# Patient Record
Sex: MALE | Race: WHITE | NOT HISPANIC OR LATINO | Employment: OTHER | URBAN - METROPOLITAN AREA
[De-identification: names, ages, dates, MRNs, and addresses within clinical notes are randomized per-mention and may not be internally consistent; named-entity substitution may affect disease eponyms.]

---

## 2024-10-09 ENCOUNTER — HOSPITAL ENCOUNTER (OUTPATIENT)
Dept: OTHER | Facility: HOSPITAL | Age: 64
Discharge: HOME | End: 2024-10-09

## 2024-10-18 ENCOUNTER — OFFICE VISIT (OUTPATIENT)
Dept: VASCULAR SURGERY | Facility: HOSPITAL | Age: 64
End: 2024-10-18
Payer: MEDICARE

## 2024-10-18 ENCOUNTER — PATIENT ROUNDING (BHMG ONLY) (OUTPATIENT)
Dept: VASCULAR SURGERY | Facility: HOSPITAL | Age: 64
End: 2024-10-18

## 2024-10-18 VITALS
WEIGHT: 225 LBS | OXYGEN SATURATION: 98 % | SYSTOLIC BLOOD PRESSURE: 176 MMHG | BODY MASS INDEX: 27.98 KG/M2 | HEART RATE: 89 BPM | HEIGHT: 75 IN | RESPIRATION RATE: 16 BRPM | TEMPERATURE: 99.1 F | DIASTOLIC BLOOD PRESSURE: 88 MMHG

## 2024-10-18 DIAGNOSIS — I65.23 OCCLUSION AND STENOSIS OF BILATERAL CAROTID ARTERIES: ICD-10-CM

## 2024-10-18 DIAGNOSIS — I25.10 CORONARY ARTERY DISEASE INVOLVING NATIVE CORONARY ARTERY OF NATIVE HEART WITHOUT ANGINA PECTORIS: Primary | ICD-10-CM

## 2024-10-18 PROCEDURE — G0463 HOSPITAL OUTPT CLINIC VISIT: HCPCS | Performed by: SURGERY

## 2024-10-18 RX ORDER — FLUTICASONE PROPIONATE 50 UG/1
2 SPRAY, METERED NASAL DAILY
COMMUNITY

## 2024-10-18 RX ORDER — CLOPIDOGREL BISULFATE 75 MG/1
75 TABLET ORAL DAILY
COMMUNITY

## 2024-10-18 RX ORDER — FINASTERIDE 5 MG/1
1 TABLET, FILM COATED ORAL DAILY
COMMUNITY

## 2024-10-18 RX ORDER — MELOXICAM 7.5 MG/1
1 TABLET ORAL DAILY
COMMUNITY

## 2024-10-18 RX ORDER — ASPIRIN 325 MG
325 TABLET, DELAYED RELEASE (ENTERIC COATED) ORAL DAILY
COMMUNITY

## 2024-10-18 RX ORDER — ZOLPIDEM TARTRATE 12.5 MG/1
1 TABLET, FILM COATED, EXTENDED RELEASE ORAL NIGHTLY
COMMUNITY

## 2024-10-18 RX ORDER — ESCITALOPRAM OXALATE 10 MG/1
10 TABLET ORAL DAILY
COMMUNITY

## 2024-10-18 RX ORDER — TESTOSTERONE CYPIONATE 200 MG/ML
200 INJECTION, SOLUTION INTRAMUSCULAR
COMMUNITY
Start: 2024-10-16

## 2024-10-18 RX ORDER — OXYCODONE AND ACETAMINOPHEN 10; 325 MG/1; MG/1
1 TABLET ORAL EVERY 6 HOURS PRN
COMMUNITY

## 2024-10-18 RX ORDER — LOVASTATIN 40 MG
1 TABLET ORAL NIGHTLY
COMMUNITY

## 2024-10-18 RX ORDER — LOSARTAN POTASSIUM 25 MG/1
1 TABLET ORAL DAILY
COMMUNITY

## 2024-10-18 RX ORDER — ESOMEPRAZOLE MAGNESIUM 40 MG/1
40 CAPSULE, DELAYED RELEASE ORAL
COMMUNITY
Start: 2024-08-20

## 2024-10-18 NOTE — PROGRESS NOTES
[facility]   HISTORY AND PHYSICAL    Patient Name: Jordan Delgado  : 1960  MRN: [id number]  Primary Care Physician:  Jordan De La Cruz MD  Date of admission: (Not on file)    Subjective   Subjective     Chief Complaint: Severe symptomatic right carotid stenosis    HPI:    Jordan Delgado is a 64 y.o. male who 1 week ago noticed some numbness and tingling of the left hand and he noticed that his speech was not right.  He went to the local emergency room and was evaluated for carotid disease and found to have severe right carotid stenosis.  An MRI was not at the time.  His left hand feels back to normal and did so within about 24 hours although his speech is still a little off.  He comes to see me for further evaluation from the vascular standpoint.  No previous similar events that he can recall.  Former smoker who smoked for about 20 years but quit about 20 years ago.  He is reasonably active but not as much as he used to.  If he goes up 1 flight of stairs he feels a little short of breath.  No previous cardiac history.    Review of Systems    Non contributory except for the History of Present Illness    Personal History     Past Medical History:   Diagnosis Date    Anxiety     Carotid stenosis        History reviewed. No pertinent surgical history.    Family History: family history is not on file. Otherwise pertinent FHx was reviewed and not pertinent to current issue.    Social History:  reports that he has never smoked. He has never used smokeless tobacco. He reports current alcohol use of about 4.0 standard drinks of alcohol per week. He reports current drug use. Drug: Marijuana.    Home Medications:  Current Outpatient Medications on File Prior to Visit   Medication Sig    aspirin 325 MG EC tablet 1 tablet Daily.    clopidogrel (PLAVIX) 75 MG tablet Take 1 tablet by mouth Daily.    escitalopram (LEXAPRO) 10 MG tablet Take 1 tablet by mouth Daily.    esomeprazole (nexIUM) 40 MG capsule Take 1  capsule by mouth Every Morning Before Breakfast.    finasteride (PROSCAR) 5 MG tablet Take 1 tablet by mouth Daily.    fluticasone (FLONASE) 50 MCG/ACT nasal spray 2 sprays by Each Nare route Daily.    losartan (COZAAR) 25 MG tablet Take 1 tablet by mouth Daily.    lovastatin (MEVACOR) 40 MG tablet Take 1 tablet by mouth Every Night.    meloxicam (MOBIC) 7.5 MG tablet Take 1 tablet by mouth Daily.    oxyCODONE-acetaminophen (PERCOCET)  MG per tablet Take 1 tablet by mouth Every 6 (Six) Hours As Needed.    Testosterone Cypionate (DEPOTESTOTERONE CYPIONATE) 200 MG/ML injection Inject 1 mL into the appropriate muscle as directed by prescriber Every 28 (Twenty-Eight) Days.    zolpidem CR (AMBIEN CR) 12.5 MG CR tablet Take 1 tablet by mouth Every Night.     No current facility-administered medications on file prior to visit.          Allergies:  No Known Allergies    Objective   Objective     Vitals:   Temp:  [99.1 °F (37.3 °C)] 99.1 °F (37.3 °C)  Heart Rate:  [89] 89  Resp:  [16] 16  BP: (176)/(88) 176/88    Physical Exam    General: Awake, alert, NAD   Eyes:  RAFA   Neck: Supple   Lungs: Clear   Heart: RRR   Abdomen: benign   Musculoskeletal:  normal motor tone, symmetric   Skin: warm, normal turgor   Neuro: strength 5/5 all extremities   Pulses: +2 bilateral radial pulses.    Diagnostic studies:   A CTA of the neck dated 10/9/2024 has been reviewed.  Severe right carotid stenosis.    Assessment & Plan   Assessment / Plan     Active Hospital Problems:  There are no active hospital problems to display for this patient.      Diagnoses and all orders for this visit:    1. Coronary artery disease involving native coronary artery of native heart without angina pectoris (Primary)  -     Ambulatory Referral to Cardiology    2. Occlusion and stenosis of bilateral carotid arteries        Assessment/plan:   Mr. Delgado has severe symptomatic right carotid stenosis.  He appears to be a good surgical candidate.  At this time  I am recommending that we proceed to a right carotid endarterectomy.  I have discussed with him in detail the mechanics of the procedure, the indications, benefits, risks, alternatives as well as potential complications to include but not limited to infection, bleeding, transfusion, reoperation, cranial nerve injury, stroke, death.  He appears to understand and desires to proceed.  Cardiac clearance will be obtained in preparation for surgery.      Electronically signed by Aniceto Saucedo MD, 10/18/24, 9:50 AM EDT.

## 2024-10-22 ENCOUNTER — OFFICE VISIT (OUTPATIENT)
Dept: CARDIOLOGY | Facility: CLINIC | Age: 64
End: 2024-10-22
Payer: MEDICARE

## 2024-10-22 VITALS
SYSTOLIC BLOOD PRESSURE: 139 MMHG | DIASTOLIC BLOOD PRESSURE: 96 MMHG | WEIGHT: 225 LBS | BODY MASS INDEX: 27.98 KG/M2 | HEIGHT: 75 IN | HEART RATE: 94 BPM

## 2024-10-22 DIAGNOSIS — R06.09 DYSPNEA ON EXERTION: ICD-10-CM

## 2024-10-22 DIAGNOSIS — Z01.810 PREOP CARDIOVASCULAR EXAM: ICD-10-CM

## 2024-10-22 DIAGNOSIS — I65.21 CAROTID ARTERY STENOSIS, SYMPTOMATIC, RIGHT: Primary | ICD-10-CM

## 2024-10-22 PROCEDURE — 1160F RVW MEDS BY RX/DR IN RCRD: CPT | Performed by: INTERNAL MEDICINE

## 2024-10-22 PROCEDURE — 99204 OFFICE O/P NEW MOD 45 MIN: CPT | Performed by: INTERNAL MEDICINE

## 2024-10-22 PROCEDURE — 1159F MED LIST DOCD IN RCRD: CPT | Performed by: INTERNAL MEDICINE

## 2024-10-22 NOTE — PROGRESS NOTES
Chief Complaint  new patient (Vascular surgery )    Subjective            Jordan Delgado presents to [facility] CARDIOLOGY  History of Present Illness    64-year-old white male.  He has no previous cardiac problems.  He recently had left-sided weakness and speech difficulty.  He went to the hospital and was diagnosed clinically with a stroke.  Subsequent imaging showed severe right carotid stenosis.  He has seen vascular surgery in consultation and will need carotid endarterectomy.  The patient's symptoms have improved.  He has no prior cardiac history.  He does report shortness of breath with exertion which has been going on for quite a while.  He denies chest pain.  His EKG during his recent hospitalization showed left bundle branch block.    PMH  Past Medical History:   Diagnosis Date    Anxiety     Carotid stenosis          SURGICALHX  No past surgical history on file.     SOC  Social History     Socioeconomic History    Marital status:    Tobacco Use    Smoking status: Never    Smokeless tobacco: Never   Vaping Use    Vaping status: Never Used   Substance and Sexual Activity    Alcohol use: Yes     Alcohol/week: 4.0 standard drinks of alcohol     Types: 4 Cans of beer per week    Drug use: Yes     Types: Marijuana    Sexual activity: Defer         FAMHX  Family History   Problem Relation Age of Onset    No Known Problems Mother     No Known Problems Father           ALLERGY  No Known Allergies     MEDSCURRENT    Current Outpatient Medications:     aspirin 325 MG EC tablet, 1 tablet Daily., Disp: , Rfl:     clopidogrel (PLAVIX) 75 MG tablet, Take 1 tablet by mouth Daily., Disp: , Rfl:     escitalopram (LEXAPRO) 10 MG tablet, Take 1 tablet by mouth Daily., Disp: , Rfl:     esomeprazole (nexIUM) 40 MG capsule, Take 1 capsule by mouth Every Morning Before Breakfast., Disp: , Rfl:     finasteride (PROSCAR) 5 MG tablet, Take 1 tablet by mouth Daily., Disp: , Rfl:     fluticasone (FLONASE)  "50 MCG/ACT nasal spray, 2 sprays by Each Nare route Daily., Disp: , Rfl:     losartan (COZAAR) 25 MG tablet, Take 1 tablet by mouth Daily., Disp: , Rfl:     lovastatin (MEVACOR) 40 MG tablet, Take 1 tablet by mouth Every Night., Disp: , Rfl:     meloxicam (MOBIC) 7.5 MG tablet, Take 1 tablet by mouth Daily., Disp: , Rfl:     oxyCODONE-acetaminophen (PERCOCET)  MG per tablet, Take 1 tablet by mouth Every 6 (Six) Hours As Needed., Disp: , Rfl:     Testosterone Cypionate (DEPOTESTOTERONE CYPIONATE) 200 MG/ML injection, Inject 1 mL into the appropriate muscle as directed by prescriber Every 28 (Twenty-Eight) Days., Disp: , Rfl:     zolpidem CR (AMBIEN CR) 12.5 MG CR tablet, Take 1 tablet by mouth Every Night., Disp: , Rfl:       Review of Systems   Constitutional: Negative.   HENT: Negative.     Eyes: Negative.    Cardiovascular:  Positive for dyspnea on exertion. Negative for chest pain.   Respiratory:  Positive for shortness of breath.    Endocrine: Negative.    Hematologic/Lymphatic: Negative.    Skin: Negative.    Musculoskeletal: Negative.    Gastrointestinal: Negative.    Genitourinary: Negative.    Neurological:  Positive for focal weakness.   Psychiatric/Behavioral: Negative.          Objective     /96   Pulse 94   Ht 190.5 cm (75\")   Wt 102 kg (225 lb)   BMI 28.12 kg/m²       General Appearance:   well developed  well nourished  HENT:   oropharynx moist  lips not cyanotic  Neck:  thyroid not enlarged  supple  Respiratory:  no respiratory distress  normal breath sounds  no rales  Cardiovascular:  no jugular venous distention  regular rhythm  apical impulse normal  S1 normal, S2 normal  no S3, no S4   no murmur  no rub, no thrill  carotid pulses normal  pedal pulses normal  lower extremity edema: none    Musculoskeletal:  no clubbing of fingers.   normocephalic, head atraumatic  Skin:   warm, dry  Psychiatric:  judgement and insight appropriate  normal mood and affect      Result Review : "             Data reviewed : Hospital records reviewed, EKG reviewed by me which showed sinus rhythm, left bundle branch block, vascular surgery office notes reviewed      Procedures           Assessment and Plan        ASSESSMENT:  Encounter Diagnoses   Name Primary?    Carotid artery stenosis, symptomatic, right Yes    Preop cardiovascular exam     Dyspnea on exertion          PLAN:    1.  Symptomatic carotid artery stenosis, on the right-he will need carotid endarterectomy.  He has dyspnea on exertion at baseline.  His EKG is abnormal with left bundle branch block.  Pharmacologic stress imaging will be scheduled to rule out high risk ischemia before vascular surgery.  Continue dual antiplatelet therapy for prevention, continue statin therapy  2.  We will discuss the diagnostic results when available, and coordinate with Dr. Saucedo          Patient was given instructions and counseling regarding his condition or for health maintenance advice. Please see specific information pulled into the AVS if appropriate.             MIRANDA Abarca MD  10/22/2024    11:15 EDT

## 2024-10-28 DIAGNOSIS — Z01.810 PREOP CARDIOVASCULAR EXAM: ICD-10-CM

## 2024-10-28 DIAGNOSIS — I65.21 CAROTID ARTERY STENOSIS, SYMPTOMATIC, RIGHT: ICD-10-CM

## 2024-10-29 ENCOUNTER — TELEPHONE (OUTPATIENT)
Dept: CARDIOLOGY | Facility: CLINIC | Age: 64
End: 2024-10-29
Payer: MEDICARE

## 2024-10-29 NOTE — TELEPHONE ENCOUNTER
----- Message from MIRANDA Abarca sent at 10/29/2024  2:42 PM EDT -----  SPECT showed no evidence of significantly blocked arteries on this study.  Heart function is in the lower range of normal    Plan is continue current medication, please send cardiac clearance notification to Dr. Saucedo for vascular surgery, low to moderate stable cardiovascular risk, no additional testing is needed

## 2024-10-30 ENCOUNTER — PREP FOR SURGERY (OUTPATIENT)
Dept: OTHER | Facility: HOSPITAL | Age: 64
End: 2024-10-30
Payer: MEDICARE

## 2024-10-30 ENCOUNTER — TELEPHONE (OUTPATIENT)
Dept: CARDIOLOGY | Facility: CLINIC | Age: 64
End: 2024-10-30
Payer: MEDICARE

## 2024-10-30 DIAGNOSIS — I65.21 CAROTID ARTERY STENOSIS, SYMPTOMATIC, RIGHT: Primary | ICD-10-CM

## 2024-10-30 RX ORDER — CLOPIDOGREL BISULFATE 75 MG/1
75 TABLET ORAL DAILY
Qty: 30 TABLET | Refills: 11 | Status: SHIPPED | OUTPATIENT
Start: 2024-10-30

## 2024-10-31 PROBLEM — I65.21 CAROTID ARTERY STENOSIS, SYMPTOMATIC, RIGHT: Status: ACTIVE | Noted: 2024-10-30

## 2024-11-07 RX ORDER — ASPIRIN 325 MG
325 TABLET, DELAYED RELEASE (ENTERIC COATED) ORAL DAILY
Qty: 90 TABLET | Refills: 3 | Status: SHIPPED | OUTPATIENT
Start: 2024-11-07

## 2024-12-10 ENCOUNTER — PRE-ADMISSION TESTING (OUTPATIENT)
Dept: PREADMISSION TESTING | Facility: HOSPITAL | Age: 64
End: 2024-12-10
Payer: MEDICARE

## 2024-12-10 VITALS
DIASTOLIC BLOOD PRESSURE: 85 MMHG | HEART RATE: 102 BPM | TEMPERATURE: 97.2 F | BODY MASS INDEX: 27.37 KG/M2 | RESPIRATION RATE: 16 BRPM | HEIGHT: 75 IN | SYSTOLIC BLOOD PRESSURE: 151 MMHG | WEIGHT: 220.13 LBS | OXYGEN SATURATION: 95 %

## 2024-12-10 DIAGNOSIS — Z01.818 PRE-OP TESTING: Primary | ICD-10-CM

## 2024-12-10 DIAGNOSIS — I65.21 CAROTID ARTERY STENOSIS, SYMPTOMATIC, RIGHT: ICD-10-CM

## 2024-12-10 LAB
ABO GROUP BLD: NORMAL
ALBUMIN SERPL-MCNC: 4.3 G/DL (ref 3.5–5.2)
ALBUMIN/GLOB SERPL: 1.6 G/DL
ALP SERPL-CCNC: 73 U/L (ref 39–117)
ALT SERPL W P-5'-P-CCNC: 37 U/L (ref 1–41)
ANION GAP SERPL CALCULATED.3IONS-SCNC: 12.7 MMOL/L (ref 5–15)
AST SERPL-CCNC: 30 U/L (ref 1–40)
BASOPHILS # BLD AUTO: 0.08 10*3/MM3 (ref 0–0.2)
BASOPHILS NFR BLD AUTO: 1 % (ref 0–1.5)
BILIRUB SERPL-MCNC: 0.8 MG/DL (ref 0–1.2)
BUN SERPL-MCNC: 16 MG/DL (ref 8–23)
BUN/CREAT SERPL: 11.5 (ref 7–25)
CALCIUM SPEC-SCNC: 9 MG/DL (ref 8.6–10.5)
CHLORIDE SERPL-SCNC: 105 MMOL/L (ref 98–107)
CO2 SERPL-SCNC: 22.3 MMOL/L (ref 22–29)
CREAT SERPL-MCNC: 1.39 MG/DL (ref 0.76–1.27)
DEPRECATED RDW RBC AUTO: 40.5 FL (ref 37–54)
EGFRCR SERPLBLD CKD-EPI 2021: 56.3 ML/MIN/1.73
EOSINOPHIL # BLD AUTO: 0.07 10*3/MM3 (ref 0–0.4)
EOSINOPHIL NFR BLD AUTO: 0.9 % (ref 0.3–6.2)
ERYTHROCYTE [DISTWIDTH] IN BLOOD BY AUTOMATED COUNT: 11.5 % (ref 12.3–15.4)
GLOBULIN UR ELPH-MCNC: 2.7 GM/DL
GLUCOSE SERPL-MCNC: 110 MG/DL (ref 65–99)
HCT VFR BLD AUTO: 45 % (ref 37.5–51)
HGB BLD-MCNC: 15.4 G/DL (ref 13–17.7)
IMM GRANULOCYTES # BLD AUTO: 0.06 10*3/MM3 (ref 0–0.05)
IMM GRANULOCYTES NFR BLD AUTO: 0.8 % (ref 0–0.5)
LYMPHOCYTES # BLD AUTO: 1.45 10*3/MM3 (ref 0.7–3.1)
LYMPHOCYTES NFR BLD AUTO: 18.7 % (ref 19.6–45.3)
MCH RBC QN AUTO: 33 PG (ref 26.6–33)
MCHC RBC AUTO-ENTMCNC: 34.2 G/DL (ref 31.5–35.7)
MCV RBC AUTO: 96.4 FL (ref 79–97)
MONOCYTES # BLD AUTO: 0.52 10*3/MM3 (ref 0.1–0.9)
MONOCYTES NFR BLD AUTO: 6.7 % (ref 5–12)
NEUTROPHILS NFR BLD AUTO: 5.57 10*3/MM3 (ref 1.7–7)
NEUTROPHILS NFR BLD AUTO: 71.9 % (ref 42.7–76)
NRBC BLD AUTO-RTO: 0 /100 WBC (ref 0–0.2)
PLATELET # BLD AUTO: 229 10*3/MM3 (ref 140–450)
PMV BLD AUTO: 10.4 FL (ref 6–12)
POTASSIUM SERPL-SCNC: 4.2 MMOL/L (ref 3.5–5.2)
PROT SERPL-MCNC: 7 G/DL (ref 6–8.5)
RBC # BLD AUTO: 4.67 10*6/MM3 (ref 4.14–5.8)
RH BLD: POSITIVE
SODIUM SERPL-SCNC: 140 MMOL/L (ref 136–145)
WBC NRBC COR # BLD AUTO: 7.75 10*3/MM3 (ref 3.4–10.8)

## 2024-12-10 PROCEDURE — 80053 COMPREHEN METABOLIC PANEL: CPT

## 2024-12-10 PROCEDURE — 85025 COMPLETE CBC W/AUTO DIFF WBC: CPT | Performed by: SURGERY

## 2024-12-10 PROCEDURE — 36415 COLL VENOUS BLD VENIPUNCTURE: CPT

## 2024-12-10 PROCEDURE — 86900 BLOOD TYPING SEROLOGIC ABO: CPT

## 2024-12-10 PROCEDURE — 86901 BLOOD TYPING SEROLOGIC RH(D): CPT

## 2024-12-10 RX ORDER — TAMSULOSIN HYDROCHLORIDE 0.4 MG/1
0.4 CAPSULE ORAL
COMMUNITY

## 2024-12-10 NOTE — DISCHARGE INSTRUCTIONS
IMPORTANT INSTRUCTIONS - PRE-ADMISSION TESTING  DO NOT EAT OR CHEW anything after midnight the night before your procedure.    NPO AFTER MN EXCEPT SIPS OF WATER TO TAKE MEDICATIONS LISTED BELOW  Take the following medications the morning of your procedure with JUST A SIP OF WATER:  ___  PER DR SHAFFER TO CONTINUE ASPIRIN AND PLAVIX UP TO AND INCLUDING DAY OF SURGERY, ESCITALOPRAM, ESOMEPRAZOLE, FINASTERIDE, OXYCODONE IF NEEDED, TAMSULOSIN____________________________________________________________________________________________________________________________________________________________________________________    DO NOT BRING your medications to the hospital with you, UNLESS something has changed since your PRE-Admission Testing appointment.  STARTING NOW Hold all vitamins, supplements, and NSAIDS INCLUDING MELOXICAM(Non- steroidal anti-inflammatory meds) for one week prior to surgery (you MAY take Tylenol or Acetaminophen).  If you are diabetic, check your blood sugar the morning of your procedure. If it is less than 70 or if you are feeling symptomatic, call the following number for further instructions: [phone]  Use your inhalers/nebulizers as usual, the morning of your procedure. BRING YOUR INHALERS with you.   Bring your CPAP or BIPAP to hospital, ONLY IF YOU WILL BE SPENDING THE NIGHT.   Make sure you have a ride home and have someone who will stay with you the day of your procedure after you go home.  If you have any questions, please call your Pre-Admission Testing Nurse, ___JOEL_____________ at [phone]____________.   Per anesthesia request, do not smoke for 24 hours before your procedure or as instructed by your surgeon.    WILL CALL ON  12/16/24       NORMALLY BETWEEN 1 AND 4 PM TO GIVE OFFICIAL ARRIVAL TIME FOR DAY OF PROCEDURE  REFER TO BATHING SHEET FOR BATHING INSTRUCTIONS . NO JEWELRY OF ANY TYPE  COME TO ENTRANCE A, ELEVATOR A, 3RD FLOOR DAY OF PROCEDURE.   OK TO CONTINUE YOUR  ASPIRIN AND PLAVIX

## 2024-12-11 ENCOUNTER — ANESTHESIA EVENT (OUTPATIENT)
Dept: PERIOP | Facility: HOSPITAL | Age: 64
End: 2024-12-11
Payer: MEDICARE

## 2024-12-16 PROCEDURE — S0260 H&P FOR SURGERY: HCPCS | Performed by: SURGERY

## 2024-12-16 NOTE — H&P
[facility]   HISTORY AND PHYSICAL    Patient Name: Jordan Delgado  : 1960  MRN: [id number]  Primary Care Physician:  Jordan De La Cruz MD  Date of admission: (Not on file)    Subjective   Subjective     Chief Complaint: Severe symptomatic right carotid stenosis.    HPI:    Jordan Delgado is a 64 y.o. male with severe symptomatic right carotid stenosis.    Review of Systems    Non contributory except for the History of Present Illness    Personal History     Past Medical History:   Diagnosis Date    Anxiety     Back pain     HAD FALL FROM BARN SEVERAL YEARS AGO    Carotid stenosis     right    Hyperlipidemia     Hypertension     LBBB (left bundle branch block)     Right foot injury     CRUSHED RIGHT FOOT SEVERAL YEARS AGO AFTER FALLING OUT OF A BARN    Slurred speech     hx of went [facility]/9/24 with slurred speech and tingling of left arm/hand       Past Surgical History:   Procedure Laterality Date    COLONOSCOPY         Family History: family history includes No Known Problems in his father and mother. Otherwise pertinent FHx was reviewed and not pertinent to current issue.    Social History:  reports that he has quit smoking. His smoking use included cigarettes. He has never used smokeless tobacco. He reports current alcohol use of about 12.0 standard drinks of alcohol per week. He reports current drug use. Drug: Marijuana.    Home Medications:  No current facility-administered medications on file prior to encounter.     Current Outpatient Medications on File Prior to Encounter   Medication Sig    clopidogrel (PLAVIX) 75 MG tablet Take 1 tablet by mouth Daily.    escitalopram (LEXAPRO) 10 MG tablet Take 1 tablet by mouth Daily.    esomeprazole (nexIUM) 40 MG capsule Take 1 capsule by mouth Every Morning Before Breakfast.    finasteride (PROSCAR) 5 MG tablet Take 1 tablet by mouth Daily.    losartan (COZAAR) 25 MG tablet Take 1 tablet by mouth Daily.    lovastatin (MEVACOR)  40 MG tablet Take 1 tablet by mouth Every Night.    meloxicam (MOBIC) 7.5 MG tablet Take 1 tablet by mouth Daily.    oxyCODONE-acetaminophen (PERCOCET)  MG per tablet Take 1 tablet by mouth Every 6 (Six) Hours As Needed.    Testosterone Cypionate (DEPOTESTOTERONE CYPIONATE) 200 MG/ML injection Inject 1 mL into the appropriate muscle as directed by prescriber Every 28 (Twenty-Eight) Days.    zolpidem CR (AMBIEN CR) 12.5 MG CR tablet Take 1 tablet by mouth Every Night.          Allergies:  No Known Allergies    Objective   Objective     Vitals:        Physical Exam    General: Awake, alert, NAD   Eyes:  RAFA   Neck: Supple   Lungs: Clear   Heart: RRR   Abdomen: benign   Musculoskeletal:  normal motor tone, symmetric   Skin: warm, normal turgor   Neuro: strength 5/5 all extremities     Diagnostic studies:   A CTA of the neck dated 10/9/2024 demonstrates severe right carotid stenosis.    Assessment & Plan   Assessment / Plan     Active Hospital Problems:  Active Hospital Problems    Diagnosis     **Carotid artery stenosis, symptomatic, right        There are no diagnoses linked to this encounter.    Assessment/plan:   Mr. Delgado has severe symptomatic right carotid stenosis.  He appears to be a good surgical candidate.  At this time I am recommending that we proceed to a right carotid endarterectomy.  I have discussed with him in detail the mechanics of the procedure, the indications, benefits, risks, alternatives as well as potential complications to include but not limited to infection, bleeding, transfusion, reoperation, cranial nerve injury, stroke, death.  He appears to understand and desires to proceed.  Cardiac clearance will be obtained in preparation for surgery.      Electronically signed by Aniceto Saucedo MD, 12/16/24, 1:17 PM EST.

## 2024-12-17 ENCOUNTER — HOSPITAL ENCOUNTER (INPATIENT)
Facility: HOSPITAL | Age: 64
LOS: 1 days | Discharge: HOME | End: 2024-12-18
Attending: SURGERY | Admitting: SURGERY
Payer: MEDICARE

## 2024-12-17 ENCOUNTER — ANESTHESIA (OUTPATIENT)
Dept: PERIOP | Facility: HOSPITAL | Age: 64
End: 2024-12-17
Payer: MEDICARE

## 2024-12-17 ENCOUNTER — ANESTHESIA EVENT CONVERTED (OUTPATIENT)
Dept: ANESTHESIOLOGY | Facility: HOSPITAL | Age: 64
End: 2024-12-17
Payer: MEDICARE

## 2024-12-17 DIAGNOSIS — I65.21 CAROTID STENOSIS, SYMPTOMATIC W/O INFARCT, RIGHT: Primary | ICD-10-CM

## 2024-12-17 DIAGNOSIS — I65.21 CAROTID ARTERY STENOSIS, SYMPTOMATIC, RIGHT: ICD-10-CM

## 2024-12-17 LAB
ABO GROUP BLD: NORMAL
ANION GAP SERPL CALCULATED.3IONS-SCNC: 9.8 MMOL/L (ref 5–15)
BLD GP AB SCN SERPL QL: NEGATIVE
BUN SERPL-MCNC: 12 MG/DL (ref 8–23)
BUN/CREAT SERPL: 11.5 (ref 7–25)
CALCIUM SPEC-SCNC: 9.1 MG/DL (ref 8.6–10.5)
CHLORIDE SERPL-SCNC: 104 MMOL/L (ref 98–107)
CO2 SERPL-SCNC: 24.2 MMOL/L (ref 22–29)
CREAT SERPL-MCNC: 1.04 MG/DL (ref 0.76–1.27)
EGFRCR SERPLBLD CKD-EPI 2021: 79.7 ML/MIN/1.73
GLUCOSE SERPL-MCNC: 111 MG/DL (ref 65–99)
POTASSIUM SERPL-SCNC: 4.6 MMOL/L (ref 3.5–5.2)
RH BLD: POSITIVE
SODIUM SERPL-SCNC: 138 MMOL/L (ref 136–145)
T&S EXPIRATION DATE: NORMAL

## 2024-12-17 PROCEDURE — 25810000003 SODIUM CHLORIDE 0.9 % SOLUTION: Performed by: SURGERY

## 2024-12-17 PROCEDURE — 25010000002 HYDROMORPHONE 1 MG/ML SOLUTION

## 2024-12-17 PROCEDURE — 35301 RECHANNELING OF ARTERY: CPT

## 2024-12-17 PROCEDURE — 25010000002 MORPHINE PER 10 MG: Performed by: SURGERY

## 2024-12-17 PROCEDURE — 25010000002 DEXAMETHASONE PER 1 MG

## 2024-12-17 PROCEDURE — 25010000003 LABETALOL 5 MG/ML SOLUTION

## 2024-12-17 PROCEDURE — 25810000003 LACTATED RINGERS PER 1000 ML: Performed by: ANESTHESIOLOGY

## 2024-12-17 PROCEDURE — 25010000002 ONDANSETRON PER 1 MG

## 2024-12-17 PROCEDURE — 25010000002 SUGAMMADEX 200 MG/2ML SOLUTION

## 2024-12-17 PROCEDURE — 03CK0ZZ EXTIRPATION OF MATTER FROM RIGHT INTERNAL CAROTID ARTERY, OPEN APPROACH: ICD-10-PCS | Performed by: SURGERY

## 2024-12-17 PROCEDURE — 86900 BLOOD TYPING SEROLOGIC ABO: CPT | Performed by: SURGERY

## 2024-12-17 PROCEDURE — 25010000002 ESMOLOL 100 MG/10ML SOLUTION

## 2024-12-17 PROCEDURE — 03CH0ZZ EXTIRPATION OF MATTER FROM RIGHT COMMON CAROTID ARTERY, OPEN APPROACH: ICD-10-PCS | Performed by: SURGERY

## 2024-12-17 PROCEDURE — 25010000002 BUPIVACAINE (PF) 0.5 % SOLUTION 10 ML VIAL: Performed by: SURGERY

## 2024-12-17 PROCEDURE — 25010000002 PHENYLEPHRINE 10 MG/ML SOLUTION 5 ML VIAL

## 2024-12-17 PROCEDURE — 25010000002 MIDAZOLAM PER 1MG: Performed by: ANESTHESIOLOGY

## 2024-12-17 PROCEDURE — 25010000002 CEFAZOLIN PER 500 MG: Performed by: SURGERY

## 2024-12-17 PROCEDURE — 25010000002 PROTAMINE SULFATE PER 10 MG

## 2024-12-17 PROCEDURE — C1768 GRAFT, VASCULAR: HCPCS | Performed by: SURGERY

## 2024-12-17 PROCEDURE — 25010000002 LIDOCAINE PF 2% 2 % SOLUTION

## 2024-12-17 PROCEDURE — 25010000002 VASOPRESSIN 20 UNIT/ML SOLUTION

## 2024-12-17 PROCEDURE — 25010000002 NITROGLYCERIN 200 MCG/ML SOLUTION

## 2024-12-17 PROCEDURE — 88311 DECALCIFY TISSUE: CPT | Performed by: SURGERY

## 2024-12-17 PROCEDURE — 25010000002 HEPARIN (PORCINE) PER 1000 UNITS

## 2024-12-17 PROCEDURE — 03UK0JZ SUPPLEMENT RIGHT INTERNAL CAROTID ARTERY WITH SYNTHETIC SUBSTITUTE, OPEN APPROACH: ICD-10-PCS | Performed by: SURGERY

## 2024-12-17 PROCEDURE — 25810000003 SODIUM CHLORIDE 0.9 % SOLUTION

## 2024-12-17 PROCEDURE — 86901 BLOOD TYPING SEROLOGIC RH(D): CPT | Performed by: SURGERY

## 2024-12-17 PROCEDURE — 88304 TISSUE EXAM BY PATHOLOGIST: CPT | Performed by: SURGERY

## 2024-12-17 PROCEDURE — 25010000002 HEPARIN (PORCINE) PER 1000 UNITS: Performed by: SURGERY

## 2024-12-17 PROCEDURE — 25010000002 PROPOFOL 10 MG/ML EMULSION

## 2024-12-17 PROCEDURE — 86850 RBC ANTIBODY SCREEN: CPT | Performed by: SURGERY

## 2024-12-17 PROCEDURE — 25010000002 LIDOCAINE 1 % SOLUTION 20 ML VIAL: Performed by: SURGERY

## 2024-12-17 PROCEDURE — 35301 RECHANNELING OF ARTERY: CPT | Performed by: SURGERY

## 2024-12-17 PROCEDURE — 80048 BASIC METABOLIC PNL TOTAL CA: CPT | Performed by: SURGERY

## 2024-12-17 DEVICE — LIGACLIP MCA MULTIPLE CLIP APPLIERS, 20 MEDIUM CLIPS
Type: IMPLANTABLE DEVICE | Site: NECK | Status: FUNCTIONAL
Brand: LIGACLIP

## 2024-12-17 DEVICE — LIGACLIP MCA MULTIPLE CLIP APPLIERS, 20 SMALL CLIPS
Type: IMPLANTABLE DEVICE | Site: NECK | Status: FUNCTIONAL
Brand: LIGACLIP

## 2024-12-17 DEVICE — ABSORBABLE HEMOSTAT (OXIDIZED REGENERATED CELLULOSE, U.S.P.)
Type: IMPLANTABLE DEVICE | Site: NECK | Status: FUNCTIONAL
Brand: SURGICEL

## 2024-12-17 DEVICE — PTCH CARDIO HEMASHIELD PLATINUM FINESSE UTHN KNIT 8X75MM: Type: IMPLANTABLE DEVICE | Site: CAROTID | Status: FUNCTIONAL

## 2024-12-17 RX ORDER — EPHEDRINE SULFATE 50 MG/ML
50 INJECTION, SOLUTION INTRAVENOUS ONCE
Status: COMPLETED | OUTPATIENT
Start: 2024-12-17 | End: 2024-12-17

## 2024-12-17 RX ORDER — HYDRALAZINE HYDROCHLORIDE 20 MG/ML
10 INJECTION INTRAMUSCULAR; INTRAVENOUS EVERY 6 HOURS PRN
Status: DISCONTINUED | OUTPATIENT
Start: 2024-12-17 | End: 2024-12-18 | Stop reason: HOSPADM

## 2024-12-17 RX ORDER — TAMSULOSIN HYDROCHLORIDE 0.4 MG/1
0.4 CAPSULE ORAL DAILY
Status: DISCONTINUED | OUTPATIENT
Start: 2024-12-18 | End: 2024-12-18 | Stop reason: HOSPADM

## 2024-12-17 RX ORDER — OXYCODONE AND ACETAMINOPHEN 5; 325 MG/1; MG/1
1 TABLET ORAL EVERY 6 HOURS PRN
Status: DISCONTINUED | OUTPATIENT
Start: 2024-12-17 | End: 2024-12-18 | Stop reason: HOSPADM

## 2024-12-17 RX ORDER — LABETALOL HYDROCHLORIDE 5 MG/ML
10 INJECTION, SOLUTION INTRAVENOUS EVERY 6 HOURS PRN
Status: DISCONTINUED | OUTPATIENT
Start: 2024-12-17 | End: 2024-12-18 | Stop reason: HOSPADM

## 2024-12-17 RX ORDER — HEPARIN SODIUM 5000 [USP'U]/ML
INJECTION, SOLUTION INTRAVENOUS; SUBCUTANEOUS AS NEEDED
Status: DISCONTINUED | OUTPATIENT
Start: 2024-12-17 | End: 2024-12-17 | Stop reason: SURG

## 2024-12-17 RX ORDER — LABETALOL HYDROCHLORIDE 5 MG/ML
10 INJECTION, SOLUTION INTRAVENOUS EVERY 6 HOURS PRN
Status: DISCONTINUED | OUTPATIENT
Start: 2024-12-18 | End: 2024-12-18 | Stop reason: HOSPADM

## 2024-12-17 RX ORDER — SODIUM CHLORIDE 9 MG/ML
INJECTION, SOLUTION INTRAVENOUS CONTINUOUS PRN
Status: DISCONTINUED | OUTPATIENT
Start: 2024-12-17 | End: 2024-12-17 | Stop reason: SURG

## 2024-12-17 RX ORDER — NALOXONE HCL 0.4 MG/ML
0.4 VIAL (ML) INJECTION
Status: DISCONTINUED | OUTPATIENT
Start: 2024-12-17 | End: 2024-12-18 | Stop reason: HOSPADM

## 2024-12-17 RX ORDER — DEXAMETHASONE SODIUM PHOSPHATE 4 MG/ML
INJECTION, SOLUTION INTRA-ARTICULAR; INTRALESIONAL; INTRAMUSCULAR; INTRAVENOUS; SOFT TISSUE AS NEEDED
Status: DISCONTINUED | OUTPATIENT
Start: 2024-12-17 | End: 2024-12-17 | Stop reason: SURG

## 2024-12-17 RX ORDER — ESMOLOL HYDROCHLORIDE 10 MG/ML
INJECTION INTRAVENOUS AS NEEDED
Status: DISCONTINUED | OUTPATIENT
Start: 2024-12-17 | End: 2024-12-17 | Stop reason: SURG

## 2024-12-17 RX ORDER — OXYCODONE HYDROCHLORIDE 5 MG/1
5 TABLET ORAL
Status: DISCONTINUED | OUTPATIENT
Start: 2024-12-17 | End: 2024-12-17 | Stop reason: HOSPADM

## 2024-12-17 RX ORDER — SODIUM CHLORIDE 9 MG/ML
75 INJECTION, SOLUTION INTRAVENOUS CONTINUOUS
Status: DISCONTINUED | OUTPATIENT
Start: 2024-12-17 | End: 2024-12-18

## 2024-12-17 RX ORDER — ACETAMINOPHEN 500 MG
1000 TABLET ORAL ONCE
Status: COMPLETED | OUTPATIENT
Start: 2024-12-17 | End: 2024-12-17

## 2024-12-17 RX ORDER — VASOPRESSIN 20 U/ML
INJECTION PARENTERAL AS NEEDED
Status: DISCONTINUED | OUTPATIENT
Start: 2024-12-17 | End: 2024-12-17 | Stop reason: SURG

## 2024-12-17 RX ORDER — ASPIRIN 325 MG
325 TABLET, DELAYED RELEASE (ENTERIC COATED) ORAL DAILY
Status: DISCONTINUED | OUTPATIENT
Start: 2024-12-18 | End: 2024-12-18 | Stop reason: HOSPADM

## 2024-12-17 RX ORDER — ACETAMINOPHEN 500 MG
1000 TABLET ORAL EVERY 8 HOURS PRN
Status: DISCONTINUED | OUTPATIENT
Start: 2024-12-17 | End: 2024-12-18 | Stop reason: HOSPADM

## 2024-12-17 RX ORDER — PHENYLEPHRINE HCL IN 0.9% NACL 0.5 MG/5ML
SYRINGE (ML) INTRAVENOUS AS NEEDED
Status: DISCONTINUED | OUTPATIENT
Start: 2024-12-17 | End: 2024-12-17

## 2024-12-17 RX ORDER — EPHEDRINE SULFATE 50 MG/ML
INJECTION INTRAVENOUS AS NEEDED
Status: DISCONTINUED | OUTPATIENT
Start: 2024-12-17 | End: 2024-12-17 | Stop reason: SURG

## 2024-12-17 RX ORDER — MIDAZOLAM HYDROCHLORIDE 2 MG/2ML
2 INJECTION, SOLUTION INTRAMUSCULAR; INTRAVENOUS ONCE
Status: COMPLETED | OUTPATIENT
Start: 2024-12-17 | End: 2024-12-17

## 2024-12-17 RX ORDER — HYDROMORPHONE HYDROCHLORIDE 1 MG/ML
0.25 INJECTION, SOLUTION INTRAMUSCULAR; INTRAVENOUS; SUBCUTANEOUS
Status: DISCONTINUED | OUTPATIENT
Start: 2024-12-17 | End: 2024-12-17 | Stop reason: HOSPADM

## 2024-12-17 RX ORDER — PROMETHAZINE HYDROCHLORIDE 12.5 MG/1
25 TABLET ORAL ONCE AS NEEDED
Status: DISCONTINUED | OUTPATIENT
Start: 2024-12-17 | End: 2024-12-17 | Stop reason: HOSPADM

## 2024-12-17 RX ORDER — ENOXAPARIN SODIUM 100 MG/ML
40 INJECTION SUBCUTANEOUS DAILY
Status: DISCONTINUED | OUTPATIENT
Start: 2024-12-18 | End: 2024-12-18 | Stop reason: HOSPADM

## 2024-12-17 RX ORDER — PROPOFOL 10 MG/ML
VIAL (ML) INTRAVENOUS AS NEEDED
Status: DISCONTINUED | OUTPATIENT
Start: 2024-12-17 | End: 2024-12-17 | Stop reason: SURG

## 2024-12-17 RX ORDER — LIDOCAINE HYDROCHLORIDE 20 MG/ML
INJECTION, SOLUTION EPIDURAL; INFILTRATION; INTRACAUDAL; PERINEURAL AS NEEDED
Status: DISCONTINUED | OUTPATIENT
Start: 2024-12-17 | End: 2024-12-17 | Stop reason: SURG

## 2024-12-17 RX ORDER — PHENYLEPHRINE HCL IN 0.9% NACL 0.5 MG/5ML
.5-3 SYRINGE (ML) INTRAVENOUS
Status: DISCONTINUED | OUTPATIENT
Start: 2024-12-17 | End: 2024-12-18 | Stop reason: HOSPADM

## 2024-12-17 RX ORDER — LABETALOL HYDROCHLORIDE 5 MG/ML
INJECTION, SOLUTION INTRAVENOUS AS NEEDED
Status: DISCONTINUED | OUTPATIENT
Start: 2024-12-17 | End: 2024-12-17 | Stop reason: SURG

## 2024-12-17 RX ORDER — ESCITALOPRAM OXALATE 10 MG/1
10 TABLET ORAL DAILY
Status: DISCONTINUED | OUTPATIENT
Start: 2024-12-18 | End: 2024-12-18 | Stop reason: HOSPADM

## 2024-12-17 RX ORDER — ZOLPIDEM TARTRATE 5 MG/1
5 TABLET ORAL NIGHTLY PRN
Status: DISCONTINUED | OUTPATIENT
Start: 2024-12-17 | End: 2024-12-18 | Stop reason: HOSPADM

## 2024-12-17 RX ORDER — PANTOPRAZOLE SODIUM 40 MG/1
40 TABLET, DELAYED RELEASE ORAL
Status: DISCONTINUED | OUTPATIENT
Start: 2024-12-18 | End: 2024-12-18 | Stop reason: HOSPADM

## 2024-12-17 RX ORDER — PROTAMINE SULFATE 10 MG/ML
INJECTION, SOLUTION INTRAVENOUS AS NEEDED
Status: DISCONTINUED | OUTPATIENT
Start: 2024-12-17 | End: 2024-12-17 | Stop reason: SURG

## 2024-12-17 RX ORDER — HYDROMORPHONE HYDROCHLORIDE 1 MG/ML
0.5 INJECTION, SOLUTION INTRAMUSCULAR; INTRAVENOUS; SUBCUTANEOUS
Status: DISCONTINUED | OUTPATIENT
Start: 2024-12-17 | End: 2024-12-18 | Stop reason: HOSPADM

## 2024-12-17 RX ORDER — FINASTERIDE 5 MG/1
5 TABLET, FILM COATED ORAL DAILY
Status: DISCONTINUED | OUTPATIENT
Start: 2024-12-18 | End: 2024-12-18 | Stop reason: HOSPADM

## 2024-12-17 RX ORDER — ONDANSETRON 2 MG/ML
4 INJECTION INTRAMUSCULAR; INTRAVENOUS ONCE AS NEEDED
Status: DISCONTINUED | OUTPATIENT
Start: 2024-12-17 | End: 2024-12-17 | Stop reason: HOSPADM

## 2024-12-17 RX ORDER — SODIUM CHLORIDE, SODIUM LACTATE, POTASSIUM CHLORIDE, CALCIUM CHLORIDE 600; 310; 30; 20 MG/100ML; MG/100ML; MG/100ML; MG/100ML
9 INJECTION, SOLUTION INTRAVENOUS CONTINUOUS PRN
Status: DISCONTINUED | OUTPATIENT
Start: 2024-12-17 | End: 2024-12-17 | Stop reason: HOSPADM

## 2024-12-17 RX ORDER — PHENYLEPHRINE HCL IN 0.9% NACL 1 MG/10 ML
SYRINGE (ML) INTRAVENOUS AS NEEDED
Status: DISCONTINUED | OUTPATIENT
Start: 2024-12-17 | End: 2024-12-17 | Stop reason: SURG

## 2024-12-17 RX ORDER — NITROGLYCERIN 20 MG/100ML
INJECTION INTRAVENOUS AS NEEDED
Status: DISCONTINUED | OUTPATIENT
Start: 2024-12-17 | End: 2024-12-17 | Stop reason: SURG

## 2024-12-17 RX ORDER — HEPARIN SODIUM 5000 [USP'U]/ML
INJECTION, SOLUTION INTRAVENOUS; SUBCUTANEOUS AS NEEDED
Status: DISCONTINUED | OUTPATIENT
Start: 2024-12-17 | End: 2024-12-17

## 2024-12-17 RX ORDER — LIDOCAINE HYDROCHLORIDE 20 MG/ML
INJECTION, SOLUTION EPIDURAL; INFILTRATION; INTRACAUDAL; PERINEURAL AS NEEDED
Status: DISCONTINUED | OUTPATIENT
Start: 2024-12-17 | End: 2024-12-17

## 2024-12-17 RX ORDER — DEXMEDETOMIDINE HYDROCHLORIDE 100 UG/ML
INJECTION, SOLUTION INTRAVENOUS AS NEEDED
Status: DISCONTINUED | OUTPATIENT
Start: 2024-12-17 | End: 2024-12-17 | Stop reason: SURG

## 2024-12-17 RX ORDER — MELOXICAM 7.5 MG/1
7.5 TABLET ORAL DAILY
Status: DISCONTINUED | OUTPATIENT
Start: 2024-12-17 | End: 2024-12-18 | Stop reason: HOSPADM

## 2024-12-17 RX ORDER — MORPHINE SULFATE 2 MG/ML
2 INJECTION, SOLUTION INTRAMUSCULAR; INTRAVENOUS
Status: DISCONTINUED | OUTPATIENT
Start: 2024-12-17 | End: 2024-12-18 | Stop reason: HOSPADM

## 2024-12-17 RX ORDER — HYDROMORPHONE HYDROCHLORIDE 1 MG/ML
0.5 INJECTION, SOLUTION INTRAMUSCULAR; INTRAVENOUS; SUBCUTANEOUS
Status: DISCONTINUED | OUTPATIENT
Start: 2024-12-17 | End: 2024-12-17 | Stop reason: HOSPADM

## 2024-12-17 RX ORDER — ONDANSETRON 2 MG/ML
4 INJECTION INTRAMUSCULAR; INTRAVENOUS EVERY 6 HOURS PRN
Status: DISCONTINUED | OUTPATIENT
Start: 2024-12-17 | End: 2024-12-18 | Stop reason: HOSPADM

## 2024-12-17 RX ORDER — ATORVASTATIN CALCIUM 10 MG/1
10 TABLET, FILM COATED ORAL DAILY
Status: DISCONTINUED | OUTPATIENT
Start: 2024-12-17 | End: 2024-12-18 | Stop reason: HOSPADM

## 2024-12-17 RX ORDER — ROCURONIUM BROMIDE 10 MG/ML
INJECTION, SOLUTION INTRAVENOUS AS NEEDED
Status: DISCONTINUED | OUTPATIENT
Start: 2024-12-17 | End: 2024-12-17 | Stop reason: SURG

## 2024-12-17 RX ORDER — PROMETHAZINE HYDROCHLORIDE 25 MG/1
25 SUPPOSITORY RECTAL ONCE AS NEEDED
Status: DISCONTINUED | OUTPATIENT
Start: 2024-12-17 | End: 2024-12-17 | Stop reason: HOSPADM

## 2024-12-17 RX ORDER — LOSARTAN POTASSIUM 50 MG/1
25 TABLET ORAL DAILY
Status: DISCONTINUED | OUTPATIENT
Start: 2024-12-17 | End: 2024-12-18 | Stop reason: HOSPADM

## 2024-12-17 RX ORDER — CLOPIDOGREL BISULFATE 75 MG/1
75 TABLET ORAL DAILY
Status: DISCONTINUED | OUTPATIENT
Start: 2024-12-18 | End: 2024-12-18 | Stop reason: HOSPADM

## 2024-12-17 RX ORDER — OXYCODONE AND ACETAMINOPHEN 10; 325 MG/1; MG/1
1 TABLET ORAL EVERY 6 HOURS PRN
Status: DISCONTINUED | OUTPATIENT
Start: 2024-12-17 | End: 2024-12-18 | Stop reason: HOSPADM

## 2024-12-17 RX ORDER — ONDANSETRON 2 MG/ML
INJECTION INTRAMUSCULAR; INTRAVENOUS AS NEEDED
Status: DISCONTINUED | OUTPATIENT
Start: 2024-12-17 | End: 2024-12-17 | Stop reason: SURG

## 2024-12-17 RX ADMIN — LABETALOL HYDROCHLORIDE 10 MG: 5 INJECTION, SOLUTION INTRAVENOUS at 13:00

## 2024-12-17 RX ADMIN — PROTAMINE SULFATE 20 MG: 10 INJECTION, SOLUTION INTRAVENOUS at 12:18

## 2024-12-17 RX ADMIN — HYDROMORPHONE HYDROCHLORIDE 0.5 MG: 1 INJECTION, SOLUTION INTRAMUSCULAR; INTRAVENOUS; SUBCUTANEOUS at 10:33

## 2024-12-17 RX ADMIN — Medication 100 MCG: at 11:17

## 2024-12-17 RX ADMIN — ESMOLOL HYDROCHLORIDE 20 MG: 100 INJECTION, SOLUTION INTRAVENOUS at 10:35

## 2024-12-17 RX ADMIN — Medication 100 MCG: at 10:41

## 2024-12-17 RX ADMIN — VASOPRESSIN 1 UNITS: 20 INJECTION INTRAVENOUS at 12:16

## 2024-12-17 RX ADMIN — MORPHINE SULFATE 2 MG: 2 INJECTION, SOLUTION INTRAMUSCULAR; INTRAVENOUS at 22:34

## 2024-12-17 RX ADMIN — LIDOCAINE HYDROCHLORIDE 100 MG: 20 INJECTION, SOLUTION INTRAVENOUS at 09:53

## 2024-12-17 RX ADMIN — ROCURONIUM BROMIDE 100 MG: 50 INJECTION INTRAVENOUS at 09:53

## 2024-12-17 RX ADMIN — SUGAMMADEX 200 MG: 100 INJECTION, SOLUTION INTRAVENOUS at 13:06

## 2024-12-17 RX ADMIN — PHENYLEPHRINE HYDROCHLORIDE 0.5 MCG/KG/MIN: 50 INJECTION INTRAVENOUS at 11:01

## 2024-12-17 RX ADMIN — PROTAMINE SULFATE 20 MG: 10 INJECTION, SOLUTION INTRAVENOUS at 12:16

## 2024-12-17 RX ADMIN — ESMOLOL HYDROCHLORIDE 40 MG: 100 INJECTION, SOLUTION INTRAVENOUS at 09:54

## 2024-12-17 RX ADMIN — ACETAMINOPHEN 1000 MG: 500 TABLET ORAL at 21:18

## 2024-12-17 RX ADMIN — MUPIROCIN 1 APPLICATION: 20 OINTMENT TOPICAL at 21:18

## 2024-12-17 RX ADMIN — ONDANSETRON 4 MG: 2 INJECTION INTRAMUSCULAR; INTRAVENOUS at 10:37

## 2024-12-17 RX ADMIN — DEXMEDETOMIDINE HYDROCHLORIDE 20 MCG: 100 INJECTION, SOLUTION, CONCENTRATE INTRAVENOUS at 12:59

## 2024-12-17 RX ADMIN — SODIUM CHLORIDE: 9 INJECTION, SOLUTION INTRAVENOUS at 10:08

## 2024-12-17 RX ADMIN — SODIUM CHLORIDE 2 G: 9 INJECTION, SOLUTION INTRAVENOUS at 09:57

## 2024-12-17 RX ADMIN — NITROGLYCERIN 40 MCG: 20 INJECTION INTRAVENOUS at 13:05

## 2024-12-17 RX ADMIN — MELOXICAM 7.5 MG: 7.5 TABLET ORAL at 14:20

## 2024-12-17 RX ADMIN — HEPARIN SODIUM 8000 UNITS: 5000 INJECTION INTRAVENOUS; SUBCUTANEOUS at 10:57

## 2024-12-17 RX ADMIN — NITROGLYCERIN 20 MCG: 20 INJECTION INTRAVENOUS at 10:48

## 2024-12-17 RX ADMIN — PROTAMINE SULFATE 20 MG: 10 INJECTION, SOLUTION INTRAVENOUS at 12:20

## 2024-12-17 RX ADMIN — EPHEDRINE SULFATE 10 MG: 50 INJECTION INTRAVENOUS at 12:14

## 2024-12-17 RX ADMIN — NITROGLYCERIN 20 MCG: 20 INJECTION INTRAVENOUS at 10:54

## 2024-12-17 RX ADMIN — SODIUM CHLORIDE, POTASSIUM CHLORIDE, SODIUM LACTATE AND CALCIUM CHLORIDE 9 ML/HR: 600; 310; 30; 20 INJECTION, SOLUTION INTRAVENOUS at 09:13

## 2024-12-17 RX ADMIN — HEPARIN SODIUM 1000 UNITS: 5000 INJECTION INTRAVENOUS; SUBCUTANEOUS at 12:07

## 2024-12-17 RX ADMIN — ZOLPIDEM TARTRATE 5 MG: 5 TABLET, COATED ORAL at 23:10

## 2024-12-17 RX ADMIN — ROCURONIUM BROMIDE 30 MG: 50 INJECTION INTRAVENOUS at 11:35

## 2024-12-17 RX ADMIN — NITROGLYCERIN 40 MCG: 20 INJECTION INTRAVENOUS at 10:16

## 2024-12-17 RX ADMIN — PROTAMINE SULFATE 10 MG: 10 INJECTION, SOLUTION INTRAVENOUS at 12:17

## 2024-12-17 RX ADMIN — DEXMEDETOMIDINE HYDROCHLORIDE 10 MCG: 100 INJECTION, SOLUTION, CONCENTRATE INTRAVENOUS at 13:02

## 2024-12-17 RX ADMIN — NITROGLYCERIN 40 MCG: 20 INJECTION INTRAVENOUS at 10:14

## 2024-12-17 RX ADMIN — LOSARTAN POTASSIUM 25 MG: 50 TABLET, FILM COATED ORAL at 14:20

## 2024-12-17 RX ADMIN — ROCURONIUM BROMIDE 20 MG: 50 INJECTION INTRAVENOUS at 12:30

## 2024-12-17 RX ADMIN — SODIUM CHLORIDE, POTASSIUM CHLORIDE, SODIUM LACTATE AND CALCIUM CHLORIDE: 600; 310; 30; 20 INJECTION, SOLUTION INTRAVENOUS at 11:05

## 2024-12-17 RX ADMIN — SODIUM CHLORIDE 2 G: 9 INJECTION, SOLUTION INTRAVENOUS at 18:01

## 2024-12-17 RX ADMIN — PROTAMINE SULFATE 10 MG: 10 INJECTION, SOLUTION INTRAVENOUS at 12:19

## 2024-12-17 RX ADMIN — ACETAMINOPHEN 1000 MG: 500 TABLET ORAL at 09:13

## 2024-12-17 RX ADMIN — SODIUM CHLORIDE: 9 INJECTION, SOLUTION INTRAVENOUS at 12:59

## 2024-12-17 RX ADMIN — DEXAMETHASONE SODIUM PHOSPHATE 4 MG: 4 INJECTION, SOLUTION INTRAMUSCULAR; INTRAVENOUS at 10:37

## 2024-12-17 RX ADMIN — EPHEDRINE SULFATE 50 MG: 50 INJECTION INTRAVENOUS at 13:38

## 2024-12-17 RX ADMIN — PROPOFOL 20 MG: 10 INJECTION, EMULSION INTRAVENOUS at 12:31

## 2024-12-17 RX ADMIN — NITROGLYCERIN 40 MCG: 20 INJECTION INTRAVENOUS at 13:08

## 2024-12-17 RX ADMIN — OXYCODONE HYDROCHLORIDE AND ACETAMINOPHEN 1 TABLET: 10; 325 TABLET ORAL at 19:41

## 2024-12-17 RX ADMIN — VASOPRESSIN 1 UNITS: 20 INJECTION INTRAVENOUS at 11:43

## 2024-12-17 RX ADMIN — SODIUM CHLORIDE 75 ML/HR: 9 INJECTION, SOLUTION INTRAVENOUS at 14:20

## 2024-12-17 RX ADMIN — LABETALOL HYDROCHLORIDE 10 MG: 5 INJECTION, SOLUTION INTRAVENOUS at 12:58

## 2024-12-17 RX ADMIN — Medication 50 MCG: at 11:00

## 2024-12-17 RX ADMIN — ATORVASTATIN CALCIUM 10 MG: 10 TABLET, FILM COATED ORAL at 14:20

## 2024-12-17 RX ADMIN — MIDAZOLAM HYDROCHLORIDE 2 MG: 1 INJECTION, SOLUTION INTRAMUSCULAR; INTRAVENOUS at 09:23

## 2024-12-17 RX ADMIN — PROPOFOL 150 MG: 10 INJECTION, EMULSION INTRAVENOUS at 09:53

## 2024-12-17 NOTE — OP NOTE
CAROTID ENDARTERECTOMY  Procedure Report    Patient Name:  Joradn Delgado  YOB: 1960    Date of Surgery:  12/17/2024     Indications: Severe symptomatic right carotid stenosis    Pre-op Diagnosis:   Carotid artery stenosis, symptomatic, right [I65.21]       Post-Op Diagnosis Codes:     * Carotid artery stenosis, symptomatic, right [I65.21]    Procedure(s):  Right carotid endarterectomy with Dacron patch angioplasty    Staff:  Surgeon(s):  Aniceto Saucedo MD    Assistant: Vane Garcia RN CSA    Anesthesia: General    Estimated Blood Loss: 300 mL    Implants:    Implant Name Type Inv. Item Serial No.  Lot No. LRB No. Used Action   CLIPAPPLR M/ ENDO LIGACLIP 20CLP 11IN MD - [id number] Implant CLIPAPPLR M/ ENDO LIGACLIP 20CLP 11IN MD  [employer]  DIV OF J AND J [id number] Right 1 Implanted   CLIPAPPLR M/ ENDO LIGACLIP 9 3/8IN SM - [id number] Implant CLIPAPPLR M/ ENDO LIGACLIP 9 3/8IN   [employer]  DIV OF J AND J [id number] Right 1 Implanted   PTCH CARDIO HEMASHIELD PLATINUM FINESSE UTHN KNIT 8X75MM - [id number] Implant PTCH CARDIO HEMASHIELD PLATINUM FINESSE UTHN KNIT 8X75MM  MAQUET [id number] Right 1 Implanted   HEMOST ABS SURGICEL 2X3 - [id number] Implant HEMOST ABS SURGICEL 2X3  [employer]  DIV OF J AND J [id number] Right 1 Implanted       Specimen: Right carotid plaque       Findings: Severe right carotid stenosis was identified.  Following completion of the procedure Doppler evaluation revealed a pulsatile signal with continuous diastolic flow.  Once awake, a brief neurologic examination revealed no gross deficits.    Complications: None    Description of Procedure: The patient was brought to the OR and was placed in the supine position.  The patient was induced into GETA.  Lines and a Rodriges were placed by Anesthesia and nursing.  The patient was then positioned with a roll under the shoulders and the head turned to the left.  The patient was then prepped and draped in the usual  aseptic fashion.  Ioban was applied to the skin.  A time out was taken to confirm patient identification, procedure and laterality.  Local anesthesia was then infiltrated over the expected area of incision.  An incision was then made on the right neck, anterior to the border of the sternocleidomastoid muscle.  The subcutaneous tissue was traversed using electrocautery.  The platysma was traversed using electrocautery.  Blunt and sharp dissection were used to dissect down to the carotid sheath.  The carotid sheath was opened longitudinally using sharp dissection.  The facial vein was identified, dissected, double ligated using 2-0 silk ties and transected.  A combination of sharp and blunt dissection were used to dissect the common carotid artery.  Circumferential control was obtained using a moistened umbilical tape and a red rubber used to convert into a Rumel tourniquet.  The dissection was then carried longitudinally along the internal carotid artery until the normal appearing internal carotid artery was reached.  Circumferential controlled was then obtained using a small vessel loop.  At this time the patient was administered systemic anticoagulation.  While awaiting for the heparin to circulate for three minutes, the external carotid and superior thyroid arteries were dissected and circumferentially controlled.  The internal carotid artery was then clamped, followed by the common, external and superior thyroid.  A longitudinal arteriotomy was created using an 11 blade, and extended using Pott's scissors until a normal appearing internal carotid lumen distally and common carotid artery lumen proximally were reached. A previously flushed 10 Pashto [place] shunt was then inserted distally. The shunt was allowed to back bleed to ensure no debris or air in the system. The shunt was then controlled to minimize blood loss. The floor of the carotid was flushed briskly several times to ensure no debris. The shunt was  then allowed to back bleed into the common and it was inserted and secured in place by tightening the Rumel tourniquet. Blood flow through the shunt was then confirmed with Doppler and found to be satisfactory. A standard endarterectomy was then performed using a [place]. The distal end point was fashioned using a [place] blade.  The external carotid was treated by eversion endarterectomy. The distal end point on the internal carotid artery was secured using two 7-0 prolene tacking stitches. The floor of the carotid and distal end point were tested by briskly flushing with heparin saline and found to be satisfactory. A Hemashield Carotid patch was then fashioned to meet the dimensions of the arteriotomy. The patch was secured in place using 6-0 prolene, with one suture beginning in each apex and ran along the sides. Just prior to completion of the anastomosis, the shunt was clamped and removed. All vessels were allowed to bleed and were re-clamped. The floor of the endarterectomy was briskly flushed with heparin saline several times to remove any potential debris or thrombus. The anastomosis was then completed. The internal carotid artery was allowed to back bleed to remove any air from the system, and it was then controlled with DeBakey forceps. The external and common were then opened. After five heartbeats the internal was opened. Flow was then evaluated with Doppler, which demonstrated a pulsatile signal with continuous low frequency diastolic flow. The entire field was inspected for hemostasis and hemostasis assured. The heparin was reversed using protamine. Once again Doppler examination revealed a pulsatile signal with continuous diastolic flow. Hemostasis was assured.   The wound was approximated in layers using 3-0 vicryl in a running fashion for approximation of the platysma followed by 4-0 monocryl in a running fashion for approximation of the skin edges.  A dressing was then applied to the skin.  The  patient was then awakened, extubated and a brief neurologic examination performed revealed no gross deficits.  The patient was then transferred to recovery room in stable hemodynamic condition.    Assistant: Vane Garcia RN CSA  was responsible for performing the following activities: First assist and their skilled assistance was necessary for the success of this case.    Aniceto Saucedo MD     Date: 12/17/2024  Time: 13:19 EST

## 2024-12-17 NOTE — PAYOR COMM NOTE
"UR DEPARTMENT    Winsome Myers RN  Phone [phone]  Fax [phone]    [address] [place] [address]    NPI [id number]  TAX ID [id number]    PHYSICIAN NAME AND NPI   YAJAIRA SHAFFER  [id number]    BED TYPE  INPATIENT CRITICAL CARE    TYPE OF ADMISSION: ED ADMISSION MEDICAL    ICD 10 CODE  I65.21    DATE OF ADMISSION 12/17/2025      Jordan Souza (64 y.o. Male)       Date of Birth   1960    Social Security Number       Address   [address] [facility] [address]    Home Phone   [phone]    MRN   [id number]       Scientology   Unknown    Marital Status                               Admission Date   12/17/24    Admission Type   Elective    Admitting Provider   Yajaira Shaffer MD    Attending Provider   Yajaira Shaffer MD    Department, Room/Bed   [facility] MAIN OR, [facility] MAIN OR/MAIN OR       Discharge Date       Discharge Disposition       Discharge Destination                                 Attending Provider: Yajaira Shaffer MD    Allergies: No Known Allergies    Isolation: None   Infection: None   Code Status: Not on file    Ht: 190.5 cm (75\")   Wt: 100 kg (221 lb 1.9 oz)    Admission Cmt: None   Principal Problem: Carotid artery stenosis, symptomatic, right [I65.21]                   Active Insurance as of 12/17/2024       Primary Coverage       Payor Plan Insurance Group Employer/Plan Group    ANTHEM MEDICARE REPLACEMENT ANTHEM MEDICARE ADVANTAGE [id number]       Payor Plan Address Payor Plan Phone Number Payor Plan Fax Number Effective Dates     [address] [phone]  1/1/2024 - None Entered    [facility] [id number]         Subscriber Name Subscriber Birth Date Member ID       JORDAN SOUZA 1960 [id number]               Secondary Coverage       Payor Plan Insurance Group Employer/Plan Group    [id number]        Payor Plan Address Payor Plan Phone Number Payor Plan Fax Number Effective Dates "    [address]   2024 - None Entered    [place] [address]         Subscriber Name Subscriber Birth Date Member ID       JORDAN SOUZA 1960 [id number]                     Emergency Contacts        (Rel.) Home Phone Work Phone Mobile Phone    KIRIT ESPAÑA (Other) -- -- [phone]              Iglesias: DEACON [id number] Tax ID [id number]     History & Physical        Aniceto Saucedo MD at 24 0815          H&P reviewed.  The patient was examined and there are no changes to the H&P  Electronically signed by Aniceto Saucedo MD at 24 0815   Source Note: H&P           [facility]   HISTORY AND PHYSICAL    Patient Name: Jordan Souza  : 1960  MRN: [id number]  Primary Care Physician:  Jordan De La Cruz MD  Date of admission: (Not on file)    Subjective  Subjective     Chief Complaint: Severe symptomatic right carotid stenosis.    HPI:    Jordan Souza is a 64 y.o. male with severe symptomatic right carotid stenosis.    Review of Systems    Non contributory except for the History of Present Illness    Personal History     Past Medical History:   Diagnosis Date   • Anxiety    • Back pain     HAD FALL FROM BARN SEVERAL YEARS AGO   • Carotid stenosis     right   • Hyperlipidemia    • Hypertension    • LBBB (left bundle branch block)    • Right foot injury     CRUSHED RIGHT FOOT SEVERAL YEARS AGO AFTER FALLING OUT OF A BARN   • Slurred speech     hx of went to [facility] 10/9/24 with slurred speech and tingling of left arm/hand       Past Surgical History:   Procedure Laterality Date   • COLONOSCOPY         Family History: family history includes No Known Problems in his father and mother. Otherwise pertinent FHx was reviewed and not pertinent to current issue.    Social History:  reports that he has quit smoking. His smoking use included cigarettes. He has never used smokeless tobacco. He reports current alcohol use of about 12.0 standard drinks of alcohol per  week. He reports current drug use. Drug: Marijuana.    Home Medications:  No current facility-administered medications on file prior to encounter.     Current Outpatient Medications on File Prior to Encounter   Medication Sig   • clopidogrel (PLAVIX) 75 MG tablet Take 1 tablet by mouth Daily.   • escitalopram (LEXAPRO) 10 MG tablet Take 1 tablet by mouth Daily.   • esomeprazole (nexIUM) 40 MG capsule Take 1 capsule by mouth Every Morning Before Breakfast.   • finasteride (PROSCAR) 5 MG tablet Take 1 tablet by mouth Daily.   • losartan (COZAAR) 25 MG tablet Take 1 tablet by mouth Daily.   • lovastatin (MEVACOR) 40 MG tablet Take 1 tablet by mouth Every Night.   • meloxicam (MOBIC) 7.5 MG tablet Take 1 tablet by mouth Daily.   • oxyCODONE-acetaminophen (PERCOCET)  MG per tablet Take 1 tablet by mouth Every 6 (Six) Hours As Needed.   • Testosterone Cypionate (DEPOTESTOTERONE CYPIONATE) 200 MG/ML injection Inject 1 mL into the appropriate muscle as directed by prescriber Every 28 (Twenty-Eight) Days.   • zolpidem CR (AMBIEN CR) 12.5 MG CR tablet Take 1 tablet by mouth Every Night.          Allergies:  No Known Allergies    Objective  Objective     Vitals:        Physical Exam    General: Awake, alert, NAD   Eyes:  RAFA   Neck: Supple   Lungs: Clear   Heart: RRR   Abdomen: benign   Musculoskeletal:  normal motor tone, symmetric   Skin: warm, normal turgor   Neuro: strength 5/5 all extremities     Diagnostic studies:   A CTA of the neck dated 10/9/2024 demonstrates severe right carotid stenosis.    Assessment & Plan  Assessment / Plan     Active Hospital Problems:  Active Hospital Problems    Diagnosis    • **Carotid artery stenosis, symptomatic, right        There are no diagnoses linked to this encounter.    Assessment/plan:   Mr. Delgado has severe symptomatic right carotid stenosis.  He appears to be a good surgical candidate.  At this time I am recommending that we proceed to a right carotid endarterectomy.  I  have discussed with him in detail the mechanics of the procedure, the indications, benefits, risks, alternatives as well as potential complications to include but not limited to infection, bleeding, transfusion, reoperation, cranial nerve injury, stroke, death.  He appears to understand and desires to proceed.  Cardiac clearance will be obtained in preparation for surgery.      Electronically signed by Aniceto Saucedo MD, 24, 1:17 PM EST.    Electronically signed by Aniceto Saucedo MD at 24 1318                 Aniceto Saucedo MD at 24 1317           [facility]   HISTORY AND PHYSICAL    Patient Name: Jordan Delgado  : 1960  MRN: [id number]  Primary Care Physician:  Jordan De La Cruz MD  Date of admission: (Not on file)    Subjective  Subjective     Chief Complaint: Severe symptomatic right carotid stenosis.    HPI:    Jordan Delgado is a 64 y.o. male with severe symptomatic right carotid stenosis.    Review of Systems    Non contributory except for the History of Present Illness    Personal History     Past Medical History:   Diagnosis Date   • Anxiety    • Back pain     HAD FALL FROM BARN SEVERAL YEARS AGO   • Carotid stenosis     right   • Hyperlipidemia    • Hypertension    • LBBB (left bundle branch block)    • Right foot injury     CRUSHED RIGHT FOOT SEVERAL YEARS AGO AFTER FALLING OUT OF A BARN   • Slurred speech     hx of went to [facility]/9/24 with slurred speech and tingling of left arm/hand       Past Surgical History:   Procedure Laterality Date   • COLONOSCOPY         Family History: family history includes No Known Problems in his father and mother. Otherwise pertinent FHx was reviewed and not pertinent to current issue.    Social History:  reports that he has quit smoking. His smoking use included cigarettes. He has never used smokeless tobacco. He reports current alcohol use of about 12.0 standard drinks of alcohol per week. He reports current drug  use. Drug: Marijuana.    Home Medications:  No current facility-administered medications on file prior to encounter.     Current Outpatient Medications on File Prior to Encounter   Medication Sig   • clopidogrel (PLAVIX) 75 MG tablet Take 1 tablet by mouth Daily.   • escitalopram (LEXAPRO) 10 MG tablet Take 1 tablet by mouth Daily.   • esomeprazole (nexIUM) 40 MG capsule Take 1 capsule by mouth Every Morning Before Breakfast.   • finasteride (PROSCAR) 5 MG tablet Take 1 tablet by mouth Daily.   • losartan (COZAAR) 25 MG tablet Take 1 tablet by mouth Daily.   • lovastatin (MEVACOR) 40 MG tablet Take 1 tablet by mouth Every Night.   • meloxicam (MOBIC) 7.5 MG tablet Take 1 tablet by mouth Daily.   • oxyCODONE-acetaminophen (PERCOCET)  MG per tablet Take 1 tablet by mouth Every 6 (Six) Hours As Needed.   • Testosterone Cypionate (DEPOTESTOTERONE CYPIONATE) 200 MG/ML injection Inject 1 mL into the appropriate muscle as directed by prescriber Every 28 (Twenty-Eight) Days.   • zolpidem CR (AMBIEN CR) 12.5 MG CR tablet Take 1 tablet by mouth Every Night.          Allergies:  No Known Allergies    Objective  Objective     Vitals:        Physical Exam    General: Awake, alert, NAD   Eyes:  RAFA   Neck: Supple   Lungs: Clear   Heart: RRR   Abdomen: benign   Musculoskeletal:  normal motor tone, symmetric   Skin: warm, normal turgor   Neuro: strength 5/5 all extremities     Diagnostic studies:   A CTA of the neck dated 10/9/2024 demonstrates severe right carotid stenosis.    Assessment & Plan  Assessment / Plan     Active Hospital Problems:  Active Hospital Problems    Diagnosis    • **Carotid artery stenosis, symptomatic, right        There are no diagnoses linked to this encounter.    Assessment/plan:   Mr. Delgado has severe symptomatic right carotid stenosis.  He appears to be a good surgical candidate.  At this time I am recommending that we proceed to a right carotid endarterectomy.  I have discussed with him in  detail the mechanics of the procedure, the indications, benefits, risks, alternatives as well as potential complications to include but not limited to infection, bleeding, transfusion, reoperation, cranial nerve injury, stroke, death.  He appears to understand and desires to proceed.  Cardiac clearance will be obtained in preparation for surgery.      Electronically signed by Aniceto Saucedo MD, 12/16/24, 1:17 PM EST.    Electronically signed by Aniceto Suacedo MD at 12/16/24 1318          Operative/Procedure Notes (last 24 hours)        Aniceto Saucedo MD at 12/17/24 1029          CAROTID ENDARTERECTOMY  Procedure Report    Patient Name:  Jordan Delgado  YOB: 1960    Date of Surgery:  12/17/2024     Indications: Severe symptomatic right carotid stenosis    Pre-op Diagnosis:   Carotid artery stenosis, symptomatic, right [I65.21]       Post-Op Diagnosis Codes:     * Carotid artery stenosis, symptomatic, right [I65.21]    Procedure(s):  Right carotid endarterectomy with Dacron patch angioplasty    Staff:  Surgeon(s):  Aniceto Saucedo MD    Assistant: Vane Garcia RN CSA    Anesthesia: General    Estimated Blood Loss: 300 mL    Implants:    Implant Name Type Inv. Item Serial No.  Lot No. LRB No. Used Action   CLIPAPPLR M/ ENDO LIGACLIP 20CLP 11IN MD - [id number] Implant CLIPAPPLR M/ ENDO LIGACLIP 20CLP 11IN MD  [employer]  DIV OF J AND J [id number] Right 1 Implanted   CLIPAPPLR M/ ENDO LIGACLIP 9 3/8IN SM - [id number] Implant CLIPAPPLR M/ ENDO LIGACLIP 9 3/8IN SM  [employer]  DIV OF J AND J [id number] Right 1 Implanted   PTCH CARDIO HEMASHIELD PLATINUM FINESSE UTHN KNIT 8X75MM - [id number] Implant PTCH CARDIO HEMASHIELD PLATINUM FINESSE UTHN KNIT 8X75MM  MAQUET [id number] Right 1 Implanted   HEMOST ABS SURGICEL 2X3 - [id number] Implant HEMOST ABS SURGICEL 2X3  [employer]  DIV OF J AND J [id number] Right 1 Implanted       Specimen: Right carotid plaque       Findings: Severe right carotid  stenosis was identified.  Following completion of the procedure Doppler evaluation revealed a pulsatile signal with continuous diastolic flow.  Once awake, a brief neurologic examination revealed no gross deficits.    Complications: None    Description of Procedure: The patient was brought to the OR and was placed in the supine position.  The patient was induced into GETA.  Lines and a Rodriges were placed by Anesthesia and nursing.  The patient was then positioned with a roll under the shoulders and the head turned to the left.  The patient was then prepped and draped in the usual aseptic fashion.  Ioban was applied to the skin.  A time out was taken to confirm patient identification, procedure and laterality.  Local anesthesia was then infiltrated over the expected area of incision.  An incision was then made on the right neck, anterior to the border of the sternocleidomastoid muscle.  The subcutaneous tissue was traversed using electrocautery.  The platysma was traversed using electrocautery.  Blunt and sharp dissection were used to dissect down to the carotid sheath.  The carotid sheath was opened longitudinally using sharp dissection.  The facial vein was identified, dissected, double ligated using 2-0 silk ties and transected.  A combination of sharp and blunt dissection were used to dissect the common carotid artery.  Circumferential control was obtained using a moistened umbilical tape and a red rubber used to convert into a Rumel tourniquet.  The dissection was then carried longitudinally along the internal carotid artery until the normal appearing internal carotid artery was reached.  Circumferential controlled was then obtained using a small vessel loop.  At this time the patient was administered systemic anticoagulation.  While awaiting for the heparin to circulate for three minutes, the external carotid and superior thyroid arteries were dissected and circumferentially controlled.  The internal carotid  artery was then clamped, followed by the common, external and superior thyroid.  A longitudinal arteriotomy was created using an 11 blade, and extended using Pott's scissors until a normal appearing internal carotid lumen distally and common carotid artery lumen proximally were reached. A previously flushed 10 Vietnamese [place] shunt was then inserted distally. The shunt was allowed to back bleed to ensure no debris or air in the system. The shunt was then controlled to minimize blood loss. The floor of the carotid was flushed briskly several times to ensure no debris. The shunt was then allowed to back bleed into the common and it was inserted and secured in place by tightening the Rumel tourniquet. Blood flow through the shunt was then confirmed with Doppler and found to be satisfactory. A standard endarterectomy was then performed using a [place]on. The distal end point was fashioned using a [place] blade.  The external carotid was treated by eversion endarterectomy. The distal end point on the internal carotid artery was secured using two 7-0 prolene tacking stitches. The floor of the carotid and distal end point were tested by briskly flushing with heparin saline and found to be satisfactory. A Hemashield Carotid patch was then fashioned to meet the dimensions of the arteriotomy. The patch was secured in place using 6-0 prolene, with one suture beginning in each apex and ran along the sides. Just prior to completion of the anastomosis, the shunt was clamped and removed. All vessels were allowed to bleed and were re-clamped. The floor of the endarterectomy was briskly flushed with heparin saline several times to remove any potential debris or thrombus. The anastomosis was then completed. The internal carotid artery was allowed to back bleed to remove any air from the system, and it was then controlled with DeBakey forceps. The external and common were then opened. After five heartbeats the internal was opened. Flow was  then evaluated with Doppler, which demonstrated a pulsatile signal with continuous low frequency diastolic flow. The entire field was inspected for hemostasis and hemostasis assured. The heparin was reversed using protamine. Once again Doppler examination revealed a pulsatile signal with continuous diastolic flow. Hemostasis was assured.   The wound was approximated in layers using 3-0 vicryl in a running fashion for approximation of the platysma followed by 4-0 monocryl in a running fashion for approximation of the skin edges.  A dressing was then applied to the skin.  The patient was then awakened, extubated and a brief neurologic examination performed revealed no gross deficits.  The patient was then transferred to recovery room in stable hemodynamic condition.    Assistant: Vane Garcia RN CSA  was responsible for performing the following activities: First assist and their skilled assistance was necessary for the success of this case.    Aniceto Saucedo MD     Date: 12/17/2024  Time: 13:19 EST    Electronically signed by Aniceto Saucedo MD at 12/17/24 1320       Physician Progress Notes (last 24 hours)  Notes from 12/16/24 1339 through 12/17/24 1339   No notes of this type exist for this encounter.       Consult Notes (last 24 hours)  Notes from 12/16/24 1339 through 12/17/24 1339   No notes of this type exist for this encounter.

## 2024-12-17 NOTE — PLAN OF CARE
Goal Outcome Evaluation:  Plan of Care Reviewed With: patient        Progress: improving  Outcome Evaluation: Pt admitted post-op for R carotid endarterectomy, Pt alert and oriented x4, on 3L NC. Pt has had no c/o pain. Bedrest until 0800 on 12/18/24

## 2024-12-17 NOTE — ANESTHESIA PREPROCEDURE EVALUATION
Anesthesia Evaluation     Patient summary reviewed and Nursing notes reviewed   no history of anesthetic complications:   NPO Solid Status: > 8 hours  NPO Liquid Status: > 2 hours           Airway   Mallampati: II  TM distance: >3 FB  Neck ROM: full  No difficulty expected  Dental    (+) partials        Pulmonary - normal exam    breath sounds clear to auscultation  (+) a smoker Former,  Cardiovascular - normal exam  Exercise tolerance: good (4-7 METS)    ECG reviewed  PT is on anticoagulation therapy  Rhythm: regular  Rate: normal    (+) hypertension, hyperlipidemia,  carotid artery disease    ROS comment: Plavix this am    Neuro/Psych  (+) TIA, psychiatric history Anxiety  GI/Hepatic/Renal/Endo - negative ROS     Musculoskeletal     (+) back pain  Abdominal    Substance History   (+) alcohol use, drug use (THC)     OB/GYN          Other - negative ROS       ROS/Med Hx Other: HX HT, LBBB, RIGHT ICA 90% STENOSIS (RECENT HOSPITALIZATION FOR SLURRED SPEECH ,WEAKNESS .  SOA + (NOT NEW).    DR. VILLARREAL10/22/24.  STRESS 10/28/24 EF 51%, NO ISCHEMIA,    CARDS CLEARANCE,MOD RISK, 10/30/24.               Anesthesia Plan    ASA 3     general and Judy     (Patient understands anesthesia not responsible for dental damage.    [place], ICU obs)  intravenous induction     Anesthetic plan, risks, benefits, and alternatives have been provided, discussed and informed consent has been obtained with: patient.    Plan discussed with CRNA.    CODE STATUS:

## 2024-12-17 NOTE — ANESTHESIA POSTPROCEDURE EVALUATION
Patient: Jordan Delgado    Procedure Summary       Date: 12/17/24 Room / Location: [facility] OR 01 / [facility] MAIN OR    Anesthesia Start: 0947 Anesthesia Stop: 1315    Procedure: Right carotid endarterectomy / Clean out the main right neck artery that goes to the brain (Right: Neck) Diagnosis:       Carotid artery stenosis, symptomatic, right      (Carotid artery stenosis, symptomatic, right [I65.21])    Surgeons: Aniceto Saucedo MD Provider: Indiana Hernandez MD    Anesthesia Type: general, Judy ASA Status: 3            Anesthesia Type: general, [place]    Vitals  Vitals Value Taken Time   BP 94/43 12/17/24 1338   Temp 36 °C (96.8 °F) 12/17/24 1346   Pulse 76 12/17/24 1347   Resp 18 12/17/24 1346   SpO2 96 % 12/17/24 1347   Vitals shown include unfiled device data.        Post Anesthesia Care and Evaluation    Patient location during evaluation: bedside  Patient participation: complete - patient participated  Level of consciousness: awake  Pain management: adequate    Airway patency: patent  PONV Status: none  Cardiovascular status: acceptable and stable  Respiratory status: acceptable  Hydration status: acceptable

## 2024-12-17 NOTE — ANESTHESIA PROCEDURE NOTES
Arterial Line      Patient reassessed immediately prior to procedure    Patient location during procedure: OR  Start time: 12/17/2024 10:08 AM  Stop Time:12/17/2024 10:14 AM       Line placed for hemodynamic monitoring and ABGs/Labs/ISTAT.  Performed By   CRNA/CAA: Jordan Wallace CRNA SRNA: Laney Sanchez SRNA  Preanesthetic Checklist  Completed: patient identified, IV checked, site marked, risks and benefits discussed, surgical consent, monitors and equipment checked, pre-op evaluation and timeout performed  Arterial Line Prep    Sterile Tech: cap, mask, sterile barriers and gloves  Prep: ChloraPrep  Patient monitoring: blood pressure monitoring, continuous pulse oximetry and EKG  Arterial Line Procedure   Laterality:right  Location:  radial artery  Catheter size: 20 G   Guidance: landmark technique and palpation technique  Number of attempts: 1  Successful placement: yes   Post Assessment   Dressing Type: occlusive dressing applied, secured with tape and wrist guard applied.   Complications no  Circ/Move/Sens Assessment: normal and unchanged.   Patient Tolerance: patient tolerated the procedure well with no apparent complications

## 2024-12-18 ENCOUNTER — READMISSION MANAGEMENT (OUTPATIENT)
Dept: CALL CENTER | Facility: HOSPITAL | Age: 64
End: 2024-12-18
Payer: MEDICARE

## 2024-12-18 VITALS
RESPIRATION RATE: 18 BRPM | HEIGHT: 75 IN | BODY MASS INDEX: 27.49 KG/M2 | HEART RATE: 87 BPM | DIASTOLIC BLOOD PRESSURE: 79 MMHG | TEMPERATURE: 97.6 F | SYSTOLIC BLOOD PRESSURE: 136 MMHG | WEIGHT: 221.12 LBS | OXYGEN SATURATION: 93 %

## 2024-12-18 LAB
ANION GAP SERPL CALCULATED.3IONS-SCNC: 10.6 MMOL/L (ref 5–15)
BASOPHILS # BLD AUTO: 0.03 10*3/MM3 (ref 0–0.2)
BASOPHILS NFR BLD AUTO: 0.3 % (ref 0–1.5)
BUN SERPL-MCNC: 13 MG/DL (ref 8–23)
BUN/CREAT SERPL: 13.8 (ref 7–25)
CALCIUM SPEC-SCNC: 8 MG/DL (ref 8.6–10.5)
CHLORIDE SERPL-SCNC: 105 MMOL/L (ref 98–107)
CO2 SERPL-SCNC: 21.4 MMOL/L (ref 22–29)
CREAT SERPL-MCNC: 0.94 MG/DL (ref 0.76–1.27)
DEPRECATED RDW RBC AUTO: 42.5 FL (ref 37–54)
EGFRCR SERPLBLD CKD-EPI 2021: 90 ML/MIN/1.73
EOSINOPHIL # BLD AUTO: 0 10*3/MM3 (ref 0–0.4)
EOSINOPHIL NFR BLD AUTO: 0 % (ref 0.3–6.2)
ERYTHROCYTE [DISTWIDTH] IN BLOOD BY AUTOMATED COUNT: 11.7 % (ref 12.3–15.4)
GLUCOSE SERPL-MCNC: 126 MG/DL (ref 65–99)
HCT VFR BLD AUTO: 35.2 % (ref 37.5–51)
HGB BLD-MCNC: 11.9 G/DL (ref 13–17.7)
IMM GRANULOCYTES # BLD AUTO: 0.05 10*3/MM3 (ref 0–0.05)
IMM GRANULOCYTES NFR BLD AUTO: 0.5 % (ref 0–0.5)
LYMPHOCYTES # BLD AUTO: 0.75 10*3/MM3 (ref 0.7–3.1)
LYMPHOCYTES NFR BLD AUTO: 7.6 % (ref 19.6–45.3)
MCH RBC QN AUTO: 33.4 PG (ref 26.6–33)
MCHC RBC AUTO-ENTMCNC: 33.8 G/DL (ref 31.5–35.7)
MCV RBC AUTO: 98.9 FL (ref 79–97)
MONOCYTES # BLD AUTO: 0.59 10*3/MM3 (ref 0.1–0.9)
MONOCYTES NFR BLD AUTO: 6 % (ref 5–12)
NEUTROPHILS NFR BLD AUTO: 8.4 10*3/MM3 (ref 1.7–7)
NEUTROPHILS NFR BLD AUTO: 85.6 % (ref 42.7–76)
NRBC BLD AUTO-RTO: 0 /100 WBC (ref 0–0.2)
PLATELET # BLD AUTO: 154 10*3/MM3 (ref 140–450)
PMV BLD AUTO: 10.7 FL (ref 6–12)
POTASSIUM SERPL-SCNC: 4.3 MMOL/L (ref 3.5–5.2)
RBC # BLD AUTO: 3.56 10*6/MM3 (ref 4.14–5.8)
SODIUM SERPL-SCNC: 137 MMOL/L (ref 136–145)
WBC NRBC COR # BLD AUTO: 9.82 10*3/MM3 (ref 3.4–10.8)
WHOLE BLOOD HOLD COAG: NORMAL

## 2024-12-18 PROCEDURE — 85025 COMPLETE CBC W/AUTO DIFF WBC: CPT | Performed by: SURGERY

## 2024-12-18 PROCEDURE — 99024 POSTOP FOLLOW-UP VISIT: CPT | Performed by: SURGERY

## 2024-12-18 PROCEDURE — 25010000002 CEFAZOLIN PER 500 MG: Performed by: SURGERY

## 2024-12-18 PROCEDURE — 80048 BASIC METABOLIC PNL TOTAL CA: CPT | Performed by: SURGERY

## 2024-12-18 PROCEDURE — 25010000002 ENOXAPARIN PER 10 MG: Performed by: SURGERY

## 2024-12-18 PROCEDURE — 25810000003 SODIUM CHLORIDE 0.9 % SOLUTION: Performed by: SURGERY

## 2024-12-18 PROCEDURE — 25010000002 MORPHINE PER 10 MG: Performed by: SURGERY

## 2024-12-18 RX ORDER — OXYCODONE AND ACETAMINOPHEN 10; 325 MG/1; MG/1
1 TABLET ORAL EVERY 6 HOURS PRN
Qty: 20 TABLET | Refills: 0 | Status: SHIPPED | OUTPATIENT
Start: 2024-12-18

## 2024-12-18 RX ADMIN — ASPIRIN 325 MG: 325 TABLET, COATED ORAL at 09:42

## 2024-12-18 RX ADMIN — ENOXAPARIN SODIUM 40 MG: 100 INJECTION SUBCUTANEOUS at 09:44

## 2024-12-18 RX ADMIN — MORPHINE SULFATE 2 MG: 2 INJECTION, SOLUTION INTRAMUSCULAR; INTRAVENOUS at 01:19

## 2024-12-18 RX ADMIN — TAMSULOSIN HYDROCHLORIDE 0.4 MG: 0.4 CAPSULE ORAL at 09:42

## 2024-12-18 RX ADMIN — SODIUM CHLORIDE 2 G: 9 INJECTION, SOLUTION INTRAVENOUS at 01:19

## 2024-12-18 RX ADMIN — ATORVASTATIN CALCIUM 10 MG: 10 TABLET, FILM COATED ORAL at 09:43

## 2024-12-18 RX ADMIN — CLOPIDOGREL BISULFATE 75 MG: 75 TABLET ORAL at 09:43

## 2024-12-18 RX ADMIN — SODIUM CHLORIDE 75 ML/HR: 9 INJECTION, SOLUTION INTRAVENOUS at 01:21

## 2024-12-18 RX ADMIN — LOSARTAN POTASSIUM 25 MG: 50 TABLET, FILM COATED ORAL at 09:42

## 2024-12-18 RX ADMIN — FINASTERIDE 5 MG: 5 TABLET, FILM COATED ORAL at 09:42

## 2024-12-18 RX ADMIN — OXYCODONE HYDROCHLORIDE AND ACETAMINOPHEN 1 TABLET: 10; 325 TABLET ORAL at 05:50

## 2024-12-18 RX ADMIN — PANTOPRAZOLE SODIUM 40 MG: 40 TABLET, DELAYED RELEASE ORAL at 05:50

## 2024-12-18 RX ADMIN — ESCITALOPRAM OXALATE 10 MG: 10 TABLET ORAL at 09:43

## 2024-12-18 NOTE — DISCHARGE INSTRUCTIONS
May shower beginning on Thursday, 12/19/2024.  Follow-up in the office in 2 weeks, call for appointment.  Resume home diet.  Resume home medications.  No lifting greater than 15 pounds for 2 weeks.  Recommend not driving until completely comfortable with neck rotation off pain medications.

## 2024-12-18 NOTE — DISCHARGE SUMMARY
[facility]         DISCHARGE SUMMARY    Patient Name: Jordan Delgado  : 1960  MRN: [id number]    Date of Admission: 2024  Date of Discharge:  2024  Primary Care Physician: Jordan De La Cruz MD    Consults       No orders found for last 30 day(s).            Presenting Problem:   Carotid artery stenosis, symptomatic, right [I65.21]  Carotid stenosis, symptomatic w/o infarct, right [I65.21]    Active and Resolved Hospital Problems:  Active Hospital Problems    Diagnosis POA    **Carotid artery stenosis, symptomatic, right [I65.21] Unknown    Carotid stenosis, symptomatic w/o infarct, right [I65.21] Yes      Resolved Hospital Problems   No resolved problems to display.         Hospital Course     Hospital Course:  Jordan Delgado is a 64 y.o. male electively admitted for right carotid endarterectomy due to symptomatic right carotid stenosis.  Surgery was uneventful.  Transferred postoperatively to recovery room then ICU overnight.  No significant events overnight.  At this time ready to go home.            Day of Discharge     Vital Signs:  Temp:  [96.4 °F (35.8 °C)-97.6 °F (36.4 °C)] 97.6 °F (36.4 °C)  Heart Rate:  [67-89] 76  Resp:  [15-21] 16  BP: ()/(43-72) 139/70  Flow (L/min) (Oxygen Therapy):  [2-3] 2    Physical Exam:  General: Alert, no acute distress  Neuro: Mild deficit of the right marginal mandibular, otherwise intact  Wound: Healing well, no signs of infection.  Mild edema.    Pertinent  and/or Most Recent Results     LAB RESULTS:      Lab 24  0308   WBC 9.82   HEMOGLOBIN 11.9*   HEMATOCRIT 35.2*   PLATELETS 154   NEUTROS ABS 8.40*   IMMATURE GRANS (ABS) 0.05   LYMPHS ABS 0.75   MONOS ABS 0.59   EOS ABS 0.00   MCV 98.9*         Lab 24  0308 24  0809   SODIUM 137 138   POTASSIUM 4.3 4.6   CHLORIDE 105 104   CO2 21.4* 24.2   ANION GAP 10.6 9.8   BUN 13 12   CREATININE 0.94 1.04   EGFR 90.0 79.7   GLUCOSE 126* 111*   CALCIUM 8.0* 9.1                      Lab 12/17/24  0809   ABO TYPING A   RH TYPING Positive   ANTIBODY SCREEN Negative         Brief Urine Lab Results       None          Microbiology Results (last 10 days)       ** No results found for the last 240 hours. **                             Labs Pending at Discharge:  Pending Labs       Order Current Status    Tissue Pathology Exam In process              Discharge Details        Discharge Medications        Changes to Medications        Instructions Start Date   oxyCODONE-acetaminophen  MG per tablet  Commonly known as: PERCOCET  What changed: reasons to take this   1 tablet, Oral, Every 6 Hours PRN             Continue These Medications        Instructions Start Date   aspirin 325 MG EC tablet   325 mg, Oral, Daily      clopidogrel 75 MG tablet  Commonly known as: PLAVIX   75 mg, Oral, Daily      escitalopram 10 MG tablet  Commonly known as: LEXAPRO   10 mg, Daily      esomeprazole 40 MG capsule  Commonly known as: nexIUM   40 mg, Every Morning Before Breakfast      finasteride 5 MG tablet  Commonly known as: PROSCAR   1 tablet, Daily      losartan 25 MG tablet  Commonly known as: COZAAR   1 tablet, Daily      lovastatin 40 MG tablet  Commonly known as: MEVACOR   1 tablet, Nightly      meloxicam 7.5 MG tablet  Commonly known as: MOBIC   1 tablet, Daily      tamsulosin 0.4 MG capsule 24 hr capsule  Commonly known as: FLOMAX   0.4 mg, Oral, Daily      Testosterone Cypionate 200 MG/ML injection  Commonly known as: DEPOTESTOTERONE CYPIONATE   200 mg, Every 28 Days      zolpidem CR 12.5 MG CR tablet  Commonly known as: AMBIEN CR   1 tablet, Nightly               No Known Allergies      Discharge Disposition:  Home or Self Care    Diet:    Resume home diet.    Condition:  Satisfactory      Discharge Activity:     As tolerated    CODE STATUS:  Code Status and Medical Interventions: CPR (Attempt to Resuscitate); Full   Ordered at: 12/17/24 2224     Level Of Support Discussed With:    Patient      Code Status (Patient has no pulse and is not breathing):    CPR (Attempt to Resuscitate)     Medical Interventions (Patient has pulse or is breathing):    Full         No future appointments.          Electronically signed by Aniceto Saucedo MD, 12/18/24, 9:03 AM EST.

## 2024-12-18 NOTE — PROGRESS NOTES
[facility]     Progress Note    Patient Name: Jordan Delgado  : 1960  MRN: [id number]  Primary Care Physician:  Jordan De La Cruz MD  Date of admission: 2024    Subjective   Subjective     POD #1 status post right carotid endarterectomy    No significant events overnight.  Doing well.  No complaints.    Objective   Objective     Vitals:   Temp:  [96.4 °F (35.8 °C)-97.6 °F (36.4 °C)] 97.6 °F (36.4 °C)  Heart Rate:  [67-89] 76  Resp:  [15-21] 16  BP: ()/(43-72) 139/70  Flow (L/min) (Oxygen Therapy):  [2-3] 2    Physical Exam   General: Alert, no acute distress  Neuro: Mild deficit of the right marginal mandibular, otherwise intact  Wound: Healing well, no signs of infection.  Mild edema.    Hgb: 11.9  Creatinine: 0.94    Assessment & Plan   Assessment / Plan     Assessment/Plan:    Satisfactory progress following right carotid endarterectomy.  Home.    Active Hospital Problems:  Active Hospital Problems    Diagnosis     **Carotid artery stenosis, symptomatic, right     Carotid stenosis, symptomatic w/o infarct, right            Electronically signed by Aniceto Saucedo MD, 24, 8:57 AM EST.

## 2024-12-18 NOTE — PLAN OF CARE
Goal Outcome Evaluation:               Pt S/p Carotid Endarterectomy, has not c/o of neck pain over night , c/o lower back pain that is chronic Home meds restarted .  Rodriges removed at 0600. Cedric scott has been off entire shift , plans to D/c today.

## 2024-12-18 NOTE — OUTREACH NOTE
Prep Survey      Flowsheet Row Responses   [facility] facility patient discharged from? [facility]   Is LACE score < 7 ? Yes   Eligibility Not Eligible   What are the reasons patient is not eligible? Other  [Low risk for readmission]   Does the patient have one of the following disease processes/diagnoses(primary or secondary)? Other   Prep survey completed? Yes            Tabitha CONKLIN - Registered Nurse

## 2024-12-18 NOTE — SIGNIFICANT NOTE
12/18/24 1050   Coping/Psychosocial   Observed Emotional State calm;cooperative   Verbalized Emotional State relief;hopefulness   Trust Relationship/Rapport empathic listening provided   Involvement in Care interacting with patient   Additional Documentation Spiritual Care (Group)   Spiritual Care   Use of Spiritual Resources non-Worship use of spiritual care   Spiritual Care Source  initiative   Spiritual Care Follow-Up follow-up, none required as presently assessed   Response to Spiritual Care receptive of support;engaged in conversation;thanks expressed   Spiritual Care Interventions supportive conversation provided   Spiritual Care Visit Type initial   Receptivity to Spiritual Care visit welcomed     Duration of visit: 6 min.

## 2024-12-19 LAB
CYTO UR: NORMAL
LAB AP CASE REPORT: NORMAL
LAB AP CLINICAL INFORMATION: NORMAL
PATH REPORT.FINAL DX SPEC: NORMAL
PATH REPORT.GROSS SPEC: NORMAL

## 2025-01-07 ENCOUNTER — OFFICE VISIT (OUTPATIENT)
Dept: VASCULAR SURGERY | Facility: HOSPITAL | Age: 65
End: 2025-01-07
Payer: MEDICARE

## 2025-01-07 VITALS
BODY MASS INDEX: 27.48 KG/M2 | WEIGHT: 221 LBS | DIASTOLIC BLOOD PRESSURE: 70 MMHG | SYSTOLIC BLOOD PRESSURE: 142 MMHG | TEMPERATURE: 98.7 F | HEIGHT: 75 IN | OXYGEN SATURATION: 97 % | HEART RATE: 67 BPM | RESPIRATION RATE: 18 BRPM

## 2025-01-07 DIAGNOSIS — I65.23 OCCLUSION AND STENOSIS OF BILATERAL CAROTID ARTERIES: Primary | ICD-10-CM

## 2025-01-07 DIAGNOSIS — Z98.890 S/P CAROTID ENDARTERECTOMY: ICD-10-CM

## 2025-01-07 PROCEDURE — 1160F RVW MEDS BY RX/DR IN RCRD: CPT | Performed by: NURSE PRACTITIONER

## 2025-01-07 PROCEDURE — 99024 POSTOP FOLLOW-UP VISIT: CPT | Performed by: NURSE PRACTITIONER

## 2025-01-07 PROCEDURE — 1159F MED LIST DOCD IN RCRD: CPT | Performed by: NURSE PRACTITIONER

## 2025-01-07 NOTE — PROGRESS NOTES
[facility]     Progress Note    Patient Name: Jordan Delgado  : 1960  MRN: [id number]  Primary Care Physician:  Jordan De La Cruz MD  Date of admission: (Not on file)  Chief Complaint:    Chief Complaint   Patient presents with    Carotid Artery Disease     Patient is here as a follow up s/p right carotid endarterectomy.        Subjective   Subjective     Jordan Delgado is a 64 y.o. male presents for follow-up, he had a right carotid endarterectomy performed Dr. Saucedo on 2024 for for severe symptomatic right carotid artery stenosis. He has some numbness along the jaw line but believes it is getting better.  Incision intact and no problems chewing or swallowing.  No complaints at this time.  He takes Plavix aspirin and statin medication daily.    Objective   Objective     Vitals:   Temp:  [98.7 °F (37.1 °C)] 98.7 °F (37.1 °C)  Heart Rate:  [67] 67  Resp:  [18] 18  BP: (142)/(70) 142/70        Physical Exam   General: Alert no acute distress  Neck: Mild bruit on right, surgical incision intact.  Extremities: Symmetrical  Neuro: No gross deficits    Result Review    Result Review:  I have personally reviewed the results from the time of this admission to 2025 13:58 EST and agree with these findings:  []  Laboratory  []  Microbiology  []  Radiology  []  EKG/Telemetry   []  Cardiology/Vascular   []  Pathology  []  Old records  []  Other:    Most notable findings include:     Assessment & Plan   Assessment / Plan     Assessment/Plan:  Diagnoses and all orders for this visit:    1. Occlusion and stenosis of bilateral carotid arteries (Primary)  -     Duplex Carotid Ultrasound CAR; Future    2. S/P carotid endarterectomy  -     Duplex Carotid Ultrasound CAR; Future    Mr. Coker doing well following a right carotid endarterectomy performed Dr. Saucedo on 2024.  Surgical incision is healing well, motor and sensory skills intact, I recommend a follow-up in 3 months with a carotid  duplex.    I have answered all of his questions and he is in agreement with the plan at this time.  Thank you for allowing me to participate in your patient's care.  Active Hospital Problems:  There are no active hospital problems to display for this patient.          Electronically signed by VIRGEN Jamison, 01/07/25, 1:24 PM EST.

## (undated) DEVICE — NECK VASCULAR-LF: Brand: MEDLINE INDUSTRIES, INC.

## (undated) DEVICE — SUT SILK 2/0 TIES 18IN A185H

## (undated) DEVICE — TOTAL TRAY, 16FR 10ML SIL FOLEY, URN: Brand: MEDLINE

## (undated) DEVICE — GLV SURG SENSICARE PI ORTHO SZ7.5 LF STRL

## (undated) DEVICE — SUT SILK 3/0 TIES 18IN A184H

## (undated) DEVICE — SLV SCD KN/LEN ADJ EXPRSS BLENDED MD 1P/U

## (undated) DEVICE — 3M™ IOBAN™ 2 ANTIMICROBIAL INCISE DRAPE 6650EZ: Brand: IOBAN™ 2

## (undated) DEVICE — SOL NS 500ML

## (undated) DEVICE — STERILE POLYISOPRENE POWDER-FREE SURGICAL GLOVES WITH EMOLLIENT COATING: Brand: PROTEXIS

## (undated) DEVICE — SUT PROLN 7/0 BV1 D/A 24IN 8702H

## (undated) DEVICE — SUT MNCRYL 4/0 PS2 18 IN

## (undated) DEVICE — PENCL SMOKE/EVAC MEGADYNE TELESCP 10FT

## (undated) DEVICE — STERILE POLYISOPRENE POWDER-FREE SURGICAL GLOVES: Brand: PROTEXIS

## (undated) DEVICE — CAROTID ARTERY SHUNT KIT,RADIOPAQUE LINE, STRAIGHT: Brand: ARGYLE

## (undated) DEVICE — GLV SURG SENSICARE PI ORTHO SZ6.5 LF STRL

## (undated) DEVICE — SUT PROLN 6/0 C1 D/A 30IN 8706H

## (undated) DEVICE — BLD OPTH BEAVER/MINIBLADE STR 180DEG DBL/BVL SS

## (undated) DEVICE — SUT SILK 0 TIES 18IN A186H BX36

## (undated) DEVICE — SUT VIC 3/0 SH 27IN J416H